# Patient Record
Sex: MALE | Race: WHITE | Employment: FULL TIME | URBAN - METROPOLITAN AREA
[De-identification: names, ages, dates, MRNs, and addresses within clinical notes are randomized per-mention and may not be internally consistent; named-entity substitution may affect disease eponyms.]

---

## 2019-03-28 ENCOUNTER — APPOINTMENT (OUTPATIENT)
Dept: GENERAL RADIOLOGY | Age: 54
End: 2019-03-28

## 2019-03-28 ENCOUNTER — HOSPITAL ENCOUNTER (EMERGENCY)
Age: 54
Discharge: LEFT BEFORE DISCHARGE | End: 2019-03-28
Attending: EMERGENCY MEDICINE

## 2019-03-28 VITALS
WEIGHT: 220 LBS | RESPIRATION RATE: 18 BRPM | HEIGHT: 69 IN | HEART RATE: 60 BPM | TEMPERATURE: 98.7 F | BODY MASS INDEX: 32.58 KG/M2 | DIASTOLIC BLOOD PRESSURE: 83 MMHG | OXYGEN SATURATION: 95 % | SYSTOLIC BLOOD PRESSURE: 120 MMHG

## 2019-03-28 DIAGNOSIS — R07.9 CHEST PAIN, UNSPECIFIED TYPE: Primary | ICD-10-CM

## 2019-03-28 DIAGNOSIS — R91.1 PULMONARY NODULE: ICD-10-CM

## 2019-03-28 LAB
ALBUMIN SERPL-MCNC: 3.7 GM/DL (ref 3.4–5)
ALP BLD-CCNC: 60 IU/L (ref 40–128)
ALT SERPL-CCNC: 38 U/L (ref 10–40)
ANION GAP SERPL CALCULATED.3IONS-SCNC: 13 MMOL/L (ref 4–16)
AST SERPL-CCNC: 29 IU/L (ref 15–37)
BASOPHILS ABSOLUTE: 0.1 K/CU MM
BASOPHILS RELATIVE PERCENT: 0.7 % (ref 0–1)
BILIRUB SERPL-MCNC: 0.3 MG/DL (ref 0–1)
BUN BLDV-MCNC: 12 MG/DL (ref 6–23)
CALCIUM SERPL-MCNC: 8.5 MG/DL (ref 8.3–10.6)
CHLORIDE BLD-SCNC: 105 MMOL/L (ref 99–110)
CO2: 19 MMOL/L (ref 21–32)
CREAT SERPL-MCNC: 1 MG/DL (ref 0.9–1.3)
D DIMER: <200 NG/ML(DDU)
DIFFERENTIAL TYPE: ABNORMAL
EKG ATRIAL RATE: 51 BPM
EKG ATRIAL RATE: 58 BPM
EKG DIAGNOSIS: NORMAL
EKG DIAGNOSIS: NORMAL
EKG P AXIS: 11 DEGREES
EKG P AXIS: 7 DEGREES
EKG P-R INTERVAL: 138 MS
EKG P-R INTERVAL: 140 MS
EKG Q-T INTERVAL: 424 MS
EKG Q-T INTERVAL: 452 MS
EKG QRS DURATION: 84 MS
EKG QRS DURATION: 96 MS
EKG QTC CALCULATION (BAZETT): 416 MS
EKG QTC CALCULATION (BAZETT): 416 MS
EKG R AXIS: 53 DEGREES
EKG R AXIS: 59 DEGREES
EKG T AXIS: 34 DEGREES
EKG T AXIS: 52 DEGREES
EKG VENTRICULAR RATE: 51 BPM
EKG VENTRICULAR RATE: 58 BPM
EOSINOPHILS ABSOLUTE: 0.1 K/CU MM
EOSINOPHILS RELATIVE PERCENT: 0.8 % (ref 0–3)
GFR AFRICAN AMERICAN: >60 ML/MIN/1.73M2
GFR NON-AFRICAN AMERICAN: >60 ML/MIN/1.73M2
GLUCOSE BLD-MCNC: 117 MG/DL (ref 70–99)
HCT VFR BLD CALC: 48.9 % (ref 42–52)
HEMOGLOBIN: 16 GM/DL (ref 13.5–18)
IMMATURE NEUTROPHIL %: 0.6 % (ref 0–0.43)
LYMPHOCYTES ABSOLUTE: 2.4 K/CU MM
LYMPHOCYTES RELATIVE PERCENT: 19.2 % (ref 24–44)
MCH RBC QN AUTO: 31.3 PG (ref 27–31)
MCHC RBC AUTO-ENTMCNC: 32.7 % (ref 32–36)
MCV RBC AUTO: 95.5 FL (ref 78–100)
MONOCYTES ABSOLUTE: 0.6 K/CU MM
MONOCYTES RELATIVE PERCENT: 5.1 % (ref 0–4)
NUCLEATED RBC %: 0 %
PDW BLD-RTO: 13.2 % (ref 11.7–14.9)
PLATELET # BLD: 288 K/CU MM (ref 140–440)
PMV BLD AUTO: 10.1 FL (ref 7.5–11.1)
POTASSIUM SERPL-SCNC: 4.9 MMOL/L (ref 3.5–5.1)
RBC # BLD: 5.12 M/CU MM (ref 4.6–6.2)
SEGMENTED NEUTROPHILS ABSOLUTE COUNT: 9.2 K/CU MM
SEGMENTED NEUTROPHILS RELATIVE PERCENT: 73.6 % (ref 36–66)
SODIUM BLD-SCNC: 137 MMOL/L (ref 135–145)
TOTAL IMMATURE NEUTOROPHIL: 0.08 K/CU MM
TOTAL NUCLEATED RBC: 0 K/CU MM
TOTAL PROTEIN: 6.5 GM/DL (ref 6.4–8.2)
TROPONIN T: 0.02 NG/ML
WBC # BLD: 12.5 K/CU MM (ref 4–10.5)

## 2019-03-28 PROCEDURE — 6370000000 HC RX 637 (ALT 250 FOR IP): Performed by: EMERGENCY MEDICINE

## 2019-03-28 PROCEDURE — 85379 FIBRIN DEGRADATION QUANT: CPT

## 2019-03-28 PROCEDURE — 93010 ELECTROCARDIOGRAM REPORT: CPT | Performed by: INTERNAL MEDICINE

## 2019-03-28 PROCEDURE — 93005 ELECTROCARDIOGRAM TRACING: CPT | Performed by: EMERGENCY MEDICINE

## 2019-03-28 PROCEDURE — 85025 COMPLETE CBC W/AUTO DIFF WBC: CPT

## 2019-03-28 PROCEDURE — 84484 ASSAY OF TROPONIN QUANT: CPT

## 2019-03-28 PROCEDURE — 36415 COLL VENOUS BLD VENIPUNCTURE: CPT

## 2019-03-28 PROCEDURE — 71046 X-RAY EXAM CHEST 2 VIEWS: CPT

## 2019-03-28 PROCEDURE — 99285 EMERGENCY DEPT VISIT HI MDM: CPT

## 2019-03-28 PROCEDURE — 80053 COMPREHEN METABOLIC PANEL: CPT

## 2019-03-28 RX ORDER — ASPIRIN 81 MG/1
81 TABLET, CHEWABLE ORAL DAILY
Qty: 30 TABLET | Refills: 0 | Status: SHIPPED | OUTPATIENT
Start: 2019-03-28 | End: 2019-03-28 | Stop reason: SDUPTHER

## 2019-03-28 RX ORDER — ASPIRIN 81 MG/1
81 TABLET, CHEWABLE ORAL DAILY
Qty: 30 TABLET | Refills: 0 | Status: ON HOLD | OUTPATIENT
Start: 2019-03-28 | End: 2019-03-31 | Stop reason: SDUPTHER

## 2019-03-28 RX ORDER — NITROGLYCERIN 0.4 MG/1
0.4 TABLET SUBLINGUAL ONCE
Status: DISCONTINUED | OUTPATIENT
Start: 2019-03-28 | End: 2019-03-28 | Stop reason: HOSPADM

## 2019-03-28 RX ORDER — ASPIRIN 81 MG/1
324 TABLET, CHEWABLE ORAL ONCE
Status: COMPLETED | OUTPATIENT
Start: 2019-03-28 | End: 2019-03-28

## 2019-03-28 RX ADMIN — ASPIRIN 81 MG 324 MG: 81 TABLET ORAL at 11:21

## 2019-03-28 ASSESSMENT — PAIN DESCRIPTION - ORIENTATION: ORIENTATION: RIGHT

## 2019-03-28 ASSESSMENT — PAIN DESCRIPTION - PAIN TYPE: TYPE: ACUTE PAIN

## 2019-03-28 ASSESSMENT — PAIN SCALES - GENERAL
PAINLEVEL_OUTOF10: 2
PAINLEVEL_OUTOF10: 4

## 2019-03-28 ASSESSMENT — PAIN DESCRIPTION - LOCATION: LOCATION: ARM;CHEST

## 2019-03-28 NOTE — ED NOTES
Discharge instructions reviewed with patient; pt voiced understanding at this time.       Pollo Reyna RN  03/28/19 6188

## 2019-03-28 NOTE — PROGRESS NOTES
Medication History  [facility]    Patient Name: Delfina Castle 1965     Medication history has been completed by: Marcille Brunner, CPhT    Source(s) of information: Patient    Primary Care Physician: No primary care provider on file. Pharmacy: N/A    Allergies as of 03/28/2019    (No Known Allergies)        Prior to Admission medications    Medication Sig Start Date End Date Taking? Authorizing Provider   UNKNOWN TO PATIENT Take 1 each by mouth as needed (heartburn)   Yes Historical Provider, MD       Medications added or changed (ex.  new medication, dosage change, interval change, formulation change):  Heartburn medication new medication    Other Comments:  Reviewed and updated med list and allergy list per patient   Patient takes an OTC heartburn medication but unsure or the name of it    To my knowledge the above medication history is accurate as of 3/28/2019 1:02 PM.   Marcille Brunner, CPhT   3/28/2019 1:02 PM   z

## 2019-03-28 NOTE — ED PROVIDER NOTES
Triage Chief Complaint:   Chest Pain and Arm Pain (right arm pain)    SONIA Vanessa is a 48 y.o. male that presents with chest pain. Patient was in baseline state of health until last night when the above started. Chest pain is described as a burning, upper chest, constant, 4 out of 10 currently and worse with exertion. Some associated right arm \"achiness\". Some shortness of breath, nausea and sweating with exertion only. Symptoms did worsen at work today which prompted ED visit. Patient has never had this before. No cough. No fever. 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 No known coronary disease. Patient is a pack-a-day smoker. No illicit substances of abuse. Remote prior stress test but no prior cardiac cath. Patient does not currently have a primary care physician or cardiologist. No history DVT or PE. No prolonged immobilization or recent travel. ROS:  General:  No fevers, no chills, no weakness  Eyes:  No recent vison changes, no discharge  ENT:  No sore throat, no nasal congestion  Cardiovascular:  + chest pain, no palpitations  Respiratory:  + shortness of breath, no cough, no wheezing  Gastrointestinal:  No pain, + nausea, no vomiting, no diarrhea  Musculoskeletal:  No muscle pain, no joint pain  Skin:  No rash, no pruritis, no easy bruising, + sweating  Neurologic:  No speech problems, no headache, no extremity numbness, no extremity tingling, no extremity weakness  Psychiatric:  No anxiety  Genitourinary:  No dysuria, no increased urinary frequency  Extremities:  no edema, + right arm pain    History reviewed. No pertinent past medical history. History reviewed. No pertinent surgical history. History reviewed. No pertinent family history.   Social History     Socioeconomic History    Marital status:      Spouse name: Not on file    Number of children: Not on file    Years of education: Not on file    Highest education level: Not on file   Occupational History    Not on file   Social Needs    Financial resource strain: Not on file    Food insecurity:     Worry: Not on file     Inability: Not on file    Transportation needs:     Medical: Not on file     Non-medical: Not on file   Tobacco Use    Smoking status: Current Every Day Smoker     Packs/day: 1.00     Types: Cigarettes   Substance and Sexual Activity    Alcohol use: Yes    Drug use: Never    Sexual activity: Not on file   Lifestyle    Physical activity:     Days per week: Not on file     Minutes per session: Not on file    Stress: Not on file   Relationships    Social connections:     Talks on phone: Not on file     Gets together: Not on file     Attends Yazidi service: Not on file     Active member of club or organization: Not on file     Attends meetings of clubs or organizations: Not on file     Relationship status: Not on file    Intimate partner violence:     Fear of current or ex partner: Not on file     Emotionally abused: Not on file     Physically abused: Not on file     Forced sexual activity: Not on file   Other Topics Concern    Not on file   Social History Narrative    Not on file     Current Facility-Administered Medications   Medication Dose Route Frequency Provider Last Rate Last Dose    nitroGLYCERIN (NITROSTAT) SL tablet 0.4 mg  0.4 mg Sublingual Once Chayo Manzano MD         No current outpatient medications on file. No Known Allergies    Nursing Notes Reviewed    Physical Exam:  ED Triage Vitals   Enc Vitals Group      BP       Pulse       Resp       Temp       Temp src       SpO2       Weight       Height       Head Circumference       Peak Flow       Pain Score       Pain Loc       Pain Edu? Excl. in [address]? My pulse ox interpretation is - normal    General appearance:  No acute distress. Sitting comfortably in bed. Pleasant. Skin:  Warm. Dry.  no diaphoresis.    Eye: Extraocular movements intact. Ears, nose, mouth and throat:  Oral mucosa moist   Neck:  Trachea midline. Extremity:  Normal ROM. No bilateral lower extremity edema. No calf tenderness to palpation. Heart:  Regular rate and rhythm, normal S1 & S2, no extra heart sounds. Perfusion:  Intact  Respiratory:  Lungs clear to auscultation bilaterally. Respirations nonlabored. Speaking clearly in full sentences. No respiratory distress. Abdominal:  Normal bowel sounds. Soft. Nontender. Non distended. Back:  No CVA tenderness to palpation     Neurological:  Alert and oriented times 3. No focal neuro deficits.              Psychiatric:  Appropriate    I have reviewed and interpreted all of the currently available lab results from this visit (if applicable):  Results for orders placed or performed during the hospital encounter of 03/28/19   CBC auto diff   Result Value Ref Range    WBC 12.5 (H) 4.0 - 10.5 K/CU MM    RBC 5.12 4.6 - 6.2 M/CU MM    Hemoglobin 16.0 13.5 - 18.0 GM/DL    Hematocrit 48.9 42 - 52 %    MCV 95.5 78 - 100 FL    MCH 31.3 (H) 27 - 31 PG    MCHC 32.7 32.0 - 36.0 %    RDW 13.2 11.7 - 14.9 %    Platelets 873 024 - 801 K/CU MM    MPV 10.1 7.5 - 11.1 FL    Differential Type AUTOMATED DIFFERENTIAL     Segs Relative 73.6 (H) 36 - 66 %    Lymphocytes % 19.2 (L) 24 - 44 %    Monocytes % 5.1 (H) 0 - 4 %    Eosinophils % 0.8 0 - 3 %    Basophils % 0.7 0 - 1 %    Segs Absolute 9.2 K/CU MM    Lymphocytes # 2.4 K/CU MM    Monocytes # 0.6 K/CU MM    Eosinophils # 0.1 K/CU MM    Basophils # 0.1 K/CU MM    Nucleated RBC % 0.0 %    Total Nucleated RBC 0.0 K/CU MM    Total Immature Neutrophil 0.08 K/CU MM    Immature Neutrophil % 0.6 (H) 0 - 0.43 %   CMP   Result Value Ref Range    Sodium 137 135 - 145 MMOL/L    Potassium 4.9 3.5 - 5.1 MMOL/L    Chloride 105 99 - 110 mMol/L    CO2 19 (L) 21 - 32 MMOL/L    BUN 12 6 - 23 MG/DL    CREATININE 1.0 0.9 - 1.3 MG/DL    Glucose 117 (H) 70 - 99 MG/DL    Calcium 8.5 8.3 - 10.6 MG/DL    Alb 3.7 3.4 - 5.0 GM/DL    Total Protein 6.5 6.4 - 8.2 GM/DL    Total Bilirubin 0.3 0.0 - 1.0 MG/DL    ALT 38 10 - 40 U/L    AST 29 15 - 37 IU/L    Alkaline Phosphatase 60 40 - 128 IU/L    GFR Non-African American >60 >60 mL/min/1.73m2    GFR African American >60 >60 mL/min/1.73m2    Anion Gap 13 4 - 16   Troponin   Result Value Ref Range    Troponin T 0.021 (H) <0.01 NG/ML   D-dimer, Quantitative   Result Value Ref Range    D-Dimer, Quant <200 <230 NG/mL(DDU)   EKG 12 Lead   Result Value Ref Range    Ventricular Rate 58 BPM    Atrial Rate 58 BPM    P-R Interval 140 ms    QRS Duration 96 ms    Q-T Interval 424 ms    QTc Calculation (Bazett) 416 ms    P Axis 11 degrees    R Axis 53 degrees    T Axis 52 degrees    Diagnosis       Sinus bradycardia  Otherwise normal ECG  No previous ECGs available  Confirmed by [facility] MADELEINE CRUM ([id number]) on 3/28/2019 3:41:33 PM     EKG 12 Lead   Result Value Ref Range    Ventricular Rate 51 BPM    Atrial Rate 51 BPM    P-R Interval 138 ms    QRS Duration 84 ms    Q-T Interval 452 ms    QTc Calculation (Bazett) 416 ms    P Axis 7 degrees    R Axis 59 degrees    T Axis 34 degrees    Diagnosis       Sinus bradycardia with sinus arrhythmia  Otherwise normal ECG  When compared with ECG of 28-MAR-2019 10:28,  No significant change was found  Confirmed by [facility] MD, [facility] ([id number]) on 3/28/2019 3:42:38 PM        Radiographs (if obtained):  [] The following radiograph was interpreted by myself in the absence of a radiologist:   [x] Radiologist's Report Reviewed:  XR CHEST STANDARD (2 VW)   Final Result   12 mm nodular density superimposed upon right upper lobe could represent bone   spur from anterior right 1st rib, underlying granuloma, but cannot exclude a   malignant pulmonary nodule. Otherwise, no definite acute pulmonary finding. RECOMMENDATION:   Consider follow-up CT scan of the chest for further evaluation of possible   pulmonary nodule right upper lobe. EKG (if obtained): (All EKG's are interpreted by myself in the absence of a cardiologist)  12 lead EKG per my interpretation:  Sinus xin at 58  Axis is   Normal  QTc is  within an acceptable range  There is no specific T wave changes appreciated. There is no specific ST wave changes appreciated. No STEMI    Prior EKG to compare with was not available. Chart review shows recent radiographs:  No results found. MDM:  Pt presents as above. Emergent conditions considered. Presentation prompted initial labs, EKG and imaging as above. EKG with sinus bradycardia as above. Patient treated symptomatically with aspirin and nitroglycerin. CBC with small leukocytosis. CMP without clinically significant derangement. Troponin is abnormal. D-dimer negative. Chest x-ray demonstrating a likely pulmonary nodule. Given patient's active chest pain, lack of recent medical evaluation or cardiology workup, abnormal troponin decision made to admit the patient. On discussion with the patient and significant other on the need for admission they're declining at this time. They report \"we should have gone to [place]\". I expressed my concern with the patient leaving at this time and on the need for admission. I also offered transfer to other facility should they want to be evaluated some more else and strongly encourage this option if he did not want to stay here. I expressed my concerns that patient could die if he leaves the hospital facility this time and they're understanding this. Both patient and wife expressed a desire to leave. I counseled them that they should they change their mind or symptoms worsen they should come back immediately for admission and further cardiac assessment or head to nearest hospital facility.   I also instructed them that should they be pursuing outpatient follow-up at the need to call cardiology today to arrange for further outpatient testing and they did verbalize understanding of doing this. Contact information for on-call cardiology was given further requests. Furthermore, patient was started on aspirin. Patient and wife with capacity to make medical decisions at this time and they both verbalized understanding of my concern the patient could be having a life-threatening cardiac/heart process and still would like to leave. Further paperwork was provided regarding my concerns and patient's chest pain as well as incidental finding of a pulmonary nodule. Patient signed out against medical advice for this reason. Questions sought and answered with the patient and wife. They voice understanding and agree with plan. Instructed to return for any worsening or worrisome concerns. Clinical Impression:  1. Chest pain, unspecified type    2. Pulmonary nodule      Disposition referral (if applicable):  No follow-up provider specified. Disposition medications (if applicable):  New Prescriptions    No medications on file       Comment: Please note this report has been produced using speech recognition software and may contain errors related to that system including errors in grammar, punctuation, and spelling, as well as words and phrases that may be inappropriate. If there are any questions or concerns please feel free to contact the dictating provider for clarification.        Monse Romo MD  03/28/19 1516

## 2019-03-29 ENCOUNTER — INITIAL CONSULT (OUTPATIENT)
Dept: CARDIOLOGY CLINIC | Age: 54
End: 2019-03-29

## 2019-03-29 ENCOUNTER — HOSPITAL ENCOUNTER (INPATIENT)
Age: 54
LOS: 2 days | Discharge: HOME | DRG: 247 | End: 2019-03-31
Attending: INTERNAL MEDICINE | Admitting: INTERNAL MEDICINE

## 2019-03-29 ENCOUNTER — PROCEDURE VISIT (OUTPATIENT)
Dept: CARDIOLOGY CLINIC | Age: 54
End: 2019-03-29

## 2019-03-29 ENCOUNTER — HOSPITAL ENCOUNTER (OUTPATIENT)
Age: 54
Discharge: HOME | DRG: 247 | End: 2019-03-29

## 2019-03-29 VITALS
SYSTOLIC BLOOD PRESSURE: 138 MMHG | DIASTOLIC BLOOD PRESSURE: 88 MMHG | BODY MASS INDEX: 33.65 KG/M2 | HEART RATE: 80 BPM | HEIGHT: 69 IN | WEIGHT: 227.2 LBS

## 2019-03-29 DIAGNOSIS — R07.2 PRECORDIAL PAIN: ICD-10-CM

## 2019-03-29 DIAGNOSIS — R07.9 CHEST PAIN, UNSPECIFIED TYPE: Primary | ICD-10-CM

## 2019-03-29 PROBLEM — Z72.0 TOBACCO ABUSE: Status: ACTIVE | Noted: 2019-03-29

## 2019-03-29 PROBLEM — I21.3 STEMI (ST ELEVATION MYOCARDIAL INFARCTION) (HCC): Status: ACTIVE | Noted: 2019-03-29

## 2019-03-29 PROBLEM — I10 ESSENTIAL HYPERTENSION: Status: ACTIVE | Noted: 2019-03-29

## 2019-03-29 LAB
ACTIVATED CLOTTING TIME, LOW RANGE: 228 SEC
ACTIVATED CLOTTING TIME, LOW RANGE: >400 SEC
LV EF: 55 %
LVEF MODALITY: NORMAL
TROPONIN T: 0.22 NG/ML
TROPONIN T: 0.47 NG/ML

## 2019-03-29 PROCEDURE — 99253 IP/OBS CNSLTJ NEW/EST LOW 45: CPT | Performed by: INTERNAL MEDICINE

## 2019-03-29 PROCEDURE — 94761 N-INVAS EAR/PLS OXIMETRY MLT: CPT

## 2019-03-29 PROCEDURE — C1887 CATHETER, GUIDING: HCPCS

## 2019-03-29 PROCEDURE — 93000 ELECTROCARDIOGRAM COMPLETE: CPT | Performed by: INTERNAL MEDICINE

## 2019-03-29 PROCEDURE — 6360000002 HC RX W HCPCS: Performed by: INTERNAL MEDICINE

## 2019-03-29 PROCEDURE — 93458 L HRT ARTERY/VENTRICLE ANGIO: CPT

## 2019-03-29 PROCEDURE — 92941 PRQ TRLML REVSC TOT OCCL AMI: CPT

## 2019-03-29 PROCEDURE — 84484 ASSAY OF TROPONIN QUANT: CPT

## 2019-03-29 PROCEDURE — 2100000000 HC CCU R&B

## 2019-03-29 PROCEDURE — 99243 OFF/OP CNSLTJ NEW/EST LOW 30: CPT | Performed by: INTERNAL MEDICINE

## 2019-03-29 PROCEDURE — C1894 INTRO/SHEATH, NON-LASER: HCPCS

## 2019-03-29 PROCEDURE — 92929 PR PRQ TRLUML CORONARY STENT W/ANGIO ADDL ART/BRNCH: CPT | Performed by: INTERNAL MEDICINE

## 2019-03-29 PROCEDURE — 6370000000 HC RX 637 (ALT 250 FOR IP): Performed by: INTERNAL MEDICINE

## 2019-03-29 PROCEDURE — 2709999900 HC NON-CHARGEABLE SUPPLY

## 2019-03-29 PROCEDURE — 85347 COAGULATION TIME ACTIVATED: CPT

## 2019-03-29 PROCEDURE — 6370000000 HC RX 637 (ALT 250 FOR IP)

## 2019-03-29 PROCEDURE — 93458 L HRT ARTERY/VENTRICLE ANGIO: CPT | Performed by: INTERNAL MEDICINE

## 2019-03-29 PROCEDURE — B2111ZZ FLUOROSCOPY OF MULTIPLE CORONARY ARTERIES USING LOW OSMOLAR CONTRAST: ICD-10-PCS | Performed by: INTERNAL MEDICINE

## 2019-03-29 PROCEDURE — B2151ZZ FLUOROSCOPY OF LEFT HEART USING LOW OSMOLAR CONTRAST: ICD-10-PCS | Performed by: INTERNAL MEDICINE

## 2019-03-29 PROCEDURE — C1760 CLOSURE DEV, VASC: HCPCS

## 2019-03-29 PROCEDURE — C1725 CATH, TRANSLUMIN NON-LASER: HCPCS

## 2019-03-29 PROCEDURE — 2580000003 HC RX 258: Performed by: INTERNAL MEDICINE

## 2019-03-29 PROCEDURE — 93015 CV STRESS TEST SUPVJ I&R: CPT | Performed by: INTERNAL MEDICINE

## 2019-03-29 PROCEDURE — C1874 STENT, COATED/COV W/DEL SYS: HCPCS

## 2019-03-29 PROCEDURE — 36415 COLL VENOUS BLD VENIPUNCTURE: CPT

## 2019-03-29 PROCEDURE — C1769 GUIDE WIRE: HCPCS

## 2019-03-29 PROCEDURE — 4A023N7 MEASUREMENT OF CARDIAC SAMPLING AND PRESSURE, LEFT HEART, PERCUTANEOUS APPROACH: ICD-10-PCS | Performed by: INTERNAL MEDICINE

## 2019-03-29 PROCEDURE — 027035Z DILATION OF CORONARY ARTERY, ONE ARTERY WITH TWO DRUG-ELUTING INTRALUMINAL DEVICES, PERCUTANEOUS APPROACH: ICD-10-PCS | Performed by: INTERNAL MEDICINE

## 2019-03-29 RX ORDER — SODIUM CHLORIDE 0.9 % (FLUSH) 0.9 %
10 SYRINGE (ML) INJECTION EVERY 12 HOURS SCHEDULED
Status: DISCONTINUED | OUTPATIENT
Start: 2019-03-29 | End: 2019-03-31 | Stop reason: HOSPADM

## 2019-03-29 RX ORDER — LISINOPRIL 10 MG/1
10 TABLET ORAL DAILY
Status: DISCONTINUED | OUTPATIENT
Start: 2019-03-29 | End: 2019-03-30

## 2019-03-29 RX ORDER — SODIUM CHLORIDE 0.9 % (FLUSH) 0.9 %
10 SYRINGE (ML) INJECTION PRN
Status: DISCONTINUED | OUTPATIENT
Start: 2019-03-29 | End: 2019-03-31 | Stop reason: HOSPADM

## 2019-03-29 RX ORDER — SODIUM CHLORIDE 9 MG/ML
INJECTION, SOLUTION INTRAVENOUS CONTINUOUS
Status: DISCONTINUED | OUTPATIENT
Start: 2019-03-29 | End: 2019-03-30

## 2019-03-29 RX ORDER — SODIUM CHLORIDE 0.9 % (FLUSH) 0.9 %
10 SYRINGE (ML) INJECTION EVERY 12 HOURS SCHEDULED
Status: DISCONTINUED | OUTPATIENT
Start: 2019-03-29 | End: 2019-03-29

## 2019-03-29 RX ORDER — ASPIRIN 81 MG/1
81 TABLET, CHEWABLE ORAL DAILY
Status: DISCONTINUED | OUTPATIENT
Start: 2019-03-30 | End: 2019-03-31 | Stop reason: HOSPADM

## 2019-03-29 RX ORDER — ENALAPRILAT 2.5 MG/2ML
1.25 INJECTION INTRAVENOUS EVERY 6 HOURS PRN
Status: DISCONTINUED | OUTPATIENT
Start: 2019-03-29 | End: 2019-03-31 | Stop reason: HOSPADM

## 2019-03-29 RX ORDER — ATORVASTATIN CALCIUM 40 MG/1
80 TABLET, FILM COATED ORAL NIGHTLY
Status: DISCONTINUED | OUTPATIENT
Start: 2019-03-29 | End: 2019-03-31 | Stop reason: HOSPADM

## 2019-03-29 RX ORDER — ONDANSETRON 2 MG/ML
4 INJECTION INTRAMUSCULAR; INTRAVENOUS EVERY 6 HOURS PRN
Status: DISCONTINUED | OUTPATIENT
Start: 2019-03-29 | End: 2019-03-31 | Stop reason: HOSPADM

## 2019-03-29 RX ORDER — ATORVASTATIN CALCIUM 20 MG/1
20 TABLET, FILM COATED ORAL NIGHTLY
Status: DISCONTINUED | OUTPATIENT
Start: 2019-03-29 | End: 2019-03-29

## 2019-03-29 RX ORDER — ACETAMINOPHEN 325 MG/1
650 TABLET ORAL EVERY 4 HOURS PRN
Status: DISCONTINUED | OUTPATIENT
Start: 2019-03-29 | End: 2019-03-31 | Stop reason: HOSPADM

## 2019-03-29 RX ORDER — SODIUM CHLORIDE 0.9 % (FLUSH) 0.9 %
10 SYRINGE (ML) INJECTION PRN
Status: DISCONTINUED | OUTPATIENT
Start: 2019-03-29 | End: 2019-03-29

## 2019-03-29 RX ORDER — ACETAMINOPHEN 80 MG
TABLET,CHEWABLE ORAL
Status: DISPENSED
Start: 2019-03-29 | End: 2019-03-30

## 2019-03-29 RX ADMIN — ENOXAPARIN SODIUM 40 MG: 40 INJECTION SUBCUTANEOUS at 14:53

## 2019-03-29 RX ADMIN — SODIUM CHLORIDE: 9 INJECTION, SOLUTION INTRAVENOUS at 23:34

## 2019-03-29 RX ADMIN — ATORVASTATIN CALCIUM 80 MG: 40 TABLET, FILM COATED ORAL at 21:00

## 2019-03-29 RX ADMIN — METOPROLOL TARTRATE 12.5 MG: 25 TABLET ORAL at 14:52

## 2019-03-29 RX ADMIN — LISINOPRIL 10 MG: 10 TABLET ORAL at 15:51

## 2019-03-29 RX ADMIN — METOPROLOL TARTRATE 12.5 MG: 25 TABLET ORAL at 21:01

## 2019-03-29 RX ADMIN — TICAGRELOR 90 MG: 90 TABLET ORAL at 21:00

## 2019-03-29 ASSESSMENT — PAIN SCALES - GENERAL
PAINLEVEL_OUTOF10: 0
PAINLEVEL_OUTOF10: 0

## 2019-03-29 NOTE — CONSULTS
CARDIOLOGY CONSULT NOTE     Reason for consultation:  STEMI    Referring physician:   Selma Weston MD     Primary care physician:  No primary care provider on file. History of pres ent illness:  Apparently patient signed out AMA from ED yesterday, when he was seen for chest pain. He was seen at our office today by my associate  FOR CONTINUED EPISODIC CHEST PAIN, noted to have inferior wall STEMI on EKG. He was referred to Cath lab for Primary intervention    Past medical history:    has a past medical history of Chest pain. Past surgical history:   has no past surgical history on file. Social History:   reports that he has been smoking cigarettes. He has been smoking about 1.00 pack per day. He has never used smokeless tobacco. He reports that he drinks alcohol. He reports that he does not use drugs. Family history:  As Reviewed family history is not on file.     No Known Allergies      sodium chloride flush 0.9 % injection 10 mL 2 times per day   sodium chloride flush 0.9 % injection 10 mL PRN   magnesium hydroxide (MILK OF MAGNESIA) 400 MG/5ML suspension 30 mL Daily PRN   ondansetron (ZOFRAN) injection 4 mg Q6H PRN   atorvastatin (LIPITOR) tablet 20 mg Nightly   [START ON 3/30/2019] aspirin chewable tablet 81 mg Daily   enoxaparin (LOVENOX) injection 40 mg Daily   acetaminophen (TYLENOL) tablet 650 mg Q4H PRN   metoprolol tartrate (LOPRESSOR) tablet 12.5 mg BID       Review of Systems:   · Constitutional: No Fever or Weight Loss  · Eyes: No Decreased Vision  · ENT: No Headaches, Hearing Loss or Vertigo  · Cardiovascular:  chest pain, no dyspnea on exertion, palpitations or loss of consciousness  · Respiratory: No cough or wheezing    · Gastrointestinal: No abdominal pain, appetite loss, blood in stools, constipation, diarrhea or heartburn  · Genitourinary: No dysuria, trouble voiding, or hematuria  · Musculoskeletal:  No gait disturbance, weakness or joint complaints  · Integumentary: No rash or pruritis  · Neurological: No TIA or stroke symptoms  · Psychiatric: No anxiety or depression  · Endocrine: No malaise, fatigue or temperature intolerance  · Hematologic/Lymphatic: No bleeding problems, blood clots or swollen lymph nodes  · Allergic/Immunologic: No nasal congestion or hives    Physical Examination:    There were no vitals taken for this visit. General Appearance:  Non-obese/Well Nourished  1. Skin: It is warm & dry. No rashes noted. 2. Eyes: No conjunctival Pallor seen. No jaundice noted. 3. HEENT: atraumatic / normocephalic. EOMI. Neck is supple there is no elevation of JVD. No thyromegaly  4. Respiratory:  · Resp Assessment: Normal  · Resp Auscultation: Normal breath sounds without dullness  5. Cardiovascular:  · Auscultation: Normal   · Carotid Arteries: Normal  · Abdominal Aorta: Normal   · Femoral Arteries: 2+ and equal  · Pedal Pulses: 2+ and equal   6. Abdomen:  · No masses or tenderness  · Liver/Spleen: No Abnormalities Noted, no organomegaly. 7. Musculoskeletal: No joint deformities. No muscle wasting  8. Extremities:  ·  No Cyanosis or Clubbing. No significant edema   9. Rectal / genital: deferred. 10. Neurological/Psychiatric:  · Oriented to time, place, and person  · Non-anxious      Lab Review   Recent Labs     03/28/19  1107   WBC 12.5*   HGB 16.0   HCT 48.9         Recent Labs     03/28/19  1107      K 4.9      CO2 19*   BUN 12   CREATININE 1.0     Recent Labs     03/28/19  1107   AST 29   ALT 38   BILITOT 0.3   ALKPHOS 60     No results for input(s): TROPONINI in the last 72 hours.   No results found for: BNP  No results found for: INR, PROTIME    EKG:  As noted above      Assessment:  Patient Active Problem List   Diagnosis Code    Precordial pain R07.2    [facility] I21.3       Recommendations:   Cath possible PCI  INFORMED CONSENT OBTAINED  ASA & Mallampati 2:2     Suzette Prince MD, 3/29/2019 12:44 PM

## 2019-03-29 NOTE — PLAN OF CARE
Problem: HEMODYNAMIC STATUS  Goal: Patient has stable vital signs and fluid balance  Outcome: Ongoing     Problem: FLUID AND ELECTROLYTE IMBALANCE  Goal: Fluid and electrolyte balance are achieved/maintained  Outcome: Ongoing     Problem: OXYGENATION/RESPIRATORY FUNCTION  Goal: Patient will maintain patent airway  Outcome: Ongoing  Goal: Patient will achieve/maintain normal respiratory rate/effort  Description  Respiratory rate and effort will be within normal limits for the patient  Outcome: Ongoing     Problem: ACTIVITY INTOLERANCE/IMPAIRED MOBILITY  Goal: Mobility/activity is maintained at optimum level for patient  Outcome: Ongoing     Problem: PSYCHOSOCIAL NEEDS  Goal: Demonstrates ability to cope with illness  Outcome: Ongoing

## 2019-03-29 NOTE — CONSULTS
Nutrition Education    Type and Reason for Visit: Consult, Patient Education    · Verbally reviewed following information with Patient: Heart Healthy Eating Nutrition Therapy (Nutrition Care Manual). · Written educational materials provided. · Contact name and number provided. · Refer to Patient Education activity for more details.     Electronically signed by Zully Oviedo RD, BLANKA on 3/29/19 at 2:26 PM    Contact Number: [id number]

## 2019-03-29 NOTE — H&P
-      History and Physical      Name:  Julio Choudhury /Age/Sex: 1965  (48 y.o. male)   MRN & CSN:  [id number] & [id number] Admission Date/Time: 3/29/2019 11:44 AM   Location:  -A PCP: No primary care provider on file. Hospital Day: 1    History of Present Illness:     Chief Complaint: chest pain    Julio Choudhury is a 48 y.o.  male  who presents with chest pain of sudden onset. Patient presents with 2 days history of chest pain. The pain is described as constant, awakening patient from sleep, burning, which radiates to the right arm. It is aggravated by walking, and alleviated by rest.  Patient rates pain as a 5/10 in intensity. Associated symptoms are chest pressure/discomfort, dyspnea, fatigue and nausea and sweating. Patient's cardiac risk factors are dyslipidemia, hypertension, male gender and microalbuminuria. Ten point ROS reviewed negative, unless as noted above    Objective: Intake/Output Summary (Last 24 hours) at 3/29/2019 1620  Last data filed at 3/29/2019 1451  Gross per 24 hour   Intake --   Output 1200 ml   Net -1200 ml      Vitals:   Vitals:    19 1520   BP:    Pulse: 67   Resp: 15   Temp:    SpO2: 98%     Physical Exam:   General appearance: alert, appears stated age, cooperative and moderately obese  Neck: no carotid bruit, no JVD and supple, symmetrical, trachea midline  Lungs: clear to auscultation bilaterally  Heart: regular rate and rhythm, S1, S2 normal, no murmur, click, rub or gallop  Abdomen: soft, non-tender; bowel sounds normal; no masses,  no organomegaly  Extremities: extremities normal, atraumatic, no cyanosis or edema  Pulses: 2+ and symmetric  Skin: Skin color, texture, turgor normal. No rashes or lesions  Neurologic: Grossly normal    Past Medical History:      Past Medical History:   Diagnosis Date    Chest pain      PSHX:  has no past surgical history on file.     Home Medications   Prior to Admission medications Medication Sig Start Date End Date Taking? Authorizing Provider   aspirin (ASPIRIN CHILDRENS) 81 MG chewable tablet Take 1 tablet by mouth daily 3/28/19  Yes Justice Brand MD       Allergies: No Known Allergies    FAM HX: family history is not on file. Soc HX:   Social History     Socioeconomic History    Marital status:      Spouse name: Not on file    Number of children: Not on file    Years of education: Not on file    Highest education level: Not on file   Occupational History    Not on file   Social Needs    Financial resource strain: Not on file    Food insecurity:     Worry: Not on file     Inability: Not on file    Transportation needs:     Medical: Not on file     Non-medical: Not on file   Tobacco Use    Smoking status: Current Every Day Smoker     Packs/day: 1.00     Types: Cigarettes    Smokeless tobacco: Never Used   Substance and Sexual Activity    Alcohol use:  Yes    Drug use: Never    Sexual activity: Not on file   Lifestyle    Physical activity:     Days per week: Not on file     Minutes per session: Not on file    Stress: Not on file   Relationships    Social connections:     Talks on phone: Not on file     Gets together: Not on file     Attends Zoroastrian service: Not on file     Active member of club or organization: Not on file     Attends meetings of clubs or organizations: Not on file     Relationship status: Not on file    Intimate partner violence:     Fear of current or ex partner: Not on file     Emotionally abused: Not on file     Physically abused: Not on file     Forced sexual activity: Not on file   Other Topics Concern    Not on file   Social History Narrative    Not on file       Medications:   Medications:    [START ON 3/30/2019] aspirin  81 mg Oral Daily    enoxaparin  40 mg Subcutaneous Daily    metoprolol tartrate  12.5 mg Oral BID    atorvastatin  80 mg Oral Nightly    sodium chloride flush  10 mL Intravenous 2 times per day    ticagrelor  90 mg Oral BID    pill splitter        lisinopril  10 mg Oral Daily      Infusions:    sodium chloride 75 mL/hr at 03/29/19 1308     PRN Meds:   magnesium hydroxide 30 mL Daily PRN   ondansetron 4 mg Q6H PRN   acetaminophen 650 mg Q4H PRN   sodium chloride flush 10 mL PRN         Assessment and Plan:   Effie Doan is a 48 y.o.  male  who presents with history, clinical and diagnostic features consistent with STEMI (ST elevation myocardial infarction) ([facility]    1.  NSTEMI  - Cardiology evaluation, interventions and recommendations acknowledged with thanks  - s/p PCI with stents x 2; will follow report  - continue post-PCI care per protocol  - currently on ASA, Lipitor, Ticagrelor, Lisinopril and Metoprolol  - will continue ASCVD risk factor mitigation          Electronically signed by Nadine Bryson MD on 3/29/2019 at 4:20 PM    Admitting Hospitalist

## 2019-03-30 LAB
ANION GAP SERPL CALCULATED.3IONS-SCNC: 9 MMOL/L (ref 4–16)
BUN BLDV-MCNC: 11 MG/DL (ref 6–23)
CALCIUM SERPL-MCNC: 8.1 MG/DL (ref 8.3–10.6)
CHLORIDE BLD-SCNC: 104 MMOL/L (ref 99–110)
CHOLESTEROL: 238 MG/DL
CO2: 24 MMOL/L (ref 21–32)
CREAT SERPL-MCNC: 1 MG/DL (ref 0.9–1.3)
ESTIMATED AVERAGE GLUCOSE: 123 MG/DL
GFR AFRICAN AMERICAN: >60 ML/MIN/1.73M2
GFR NON-AFRICAN AMERICAN: >60 ML/MIN/1.73M2
GLUCOSE BLD-MCNC: 111 MG/DL (ref 70–99)
HBA1C MFR BLD: 5.9 % (ref 4.2–6.3)
HCT VFR BLD CALC: 48.6 % (ref 42–52)
HDLC SERPL-MCNC: 39 MG/DL
HEMOGLOBIN: 16.1 GM/DL (ref 13.5–18)
LDL CHOLESTEROL DIRECT: 198 MG/DL
MCH RBC QN AUTO: 30.7 PG (ref 27–31)
MCHC RBC AUTO-ENTMCNC: 33.1 % (ref 32–36)
MCV RBC AUTO: 92.6 FL (ref 78–100)
PDW BLD-RTO: 13.1 % (ref 11.7–14.9)
PLATELET # BLD: 266 K/CU MM (ref 140–440)
PMV BLD AUTO: 10.4 FL (ref 7.5–11.1)
POTASSIUM SERPL-SCNC: 4.5 MMOL/L (ref 3.5–5.1)
RBC # BLD: 5.25 M/CU MM (ref 4.6–6.2)
SODIUM BLD-SCNC: 137 MMOL/L (ref 135–145)
TRIGL SERPL-MCNC: 177 MG/DL
WBC # BLD: 10.9 K/CU MM (ref 4–10.5)

## 2019-03-30 PROCEDURE — 6370000000 HC RX 637 (ALT 250 FOR IP): Performed by: INTERNAL MEDICINE

## 2019-03-30 PROCEDURE — 99233 SBSQ HOSP IP/OBS HIGH 50: CPT | Performed by: INTERNAL MEDICINE

## 2019-03-30 PROCEDURE — 85027 COMPLETE CBC AUTOMATED: CPT

## 2019-03-30 PROCEDURE — 80048 BASIC METABOLIC PNL TOTAL CA: CPT

## 2019-03-30 PROCEDURE — 80061 LIPID PANEL: CPT

## 2019-03-30 PROCEDURE — 2140000000 HC CCU INTERMEDIATE R&B

## 2019-03-30 PROCEDURE — 2580000003 HC RX 258: Performed by: INTERNAL MEDICINE

## 2019-03-30 PROCEDURE — 83721 ASSAY OF BLOOD LIPOPROTEIN: CPT

## 2019-03-30 PROCEDURE — 6360000002 HC RX W HCPCS: Performed by: INTERNAL MEDICINE

## 2019-03-30 PROCEDURE — 83036 HEMOGLOBIN GLYCOSYLATED A1C: CPT

## 2019-03-30 PROCEDURE — 94761 N-INVAS EAR/PLS OXIMETRY MLT: CPT

## 2019-03-30 RX ORDER — LISINOPRIL 20 MG/1
20 TABLET ORAL DAILY
Status: DISCONTINUED | OUTPATIENT
Start: 2019-03-31 | End: 2019-03-31 | Stop reason: HOSPADM

## 2019-03-30 RX ADMIN — LISINOPRIL 10 MG: 10 TABLET ORAL at 07:56

## 2019-03-30 RX ADMIN — TICAGRELOR 90 MG: 90 TABLET ORAL at 21:12

## 2019-03-30 RX ADMIN — SODIUM CHLORIDE, PRESERVATIVE FREE 10 ML: 5 INJECTION INTRAVENOUS at 21:13

## 2019-03-30 RX ADMIN — ATORVASTATIN CALCIUM 80 MG: 40 TABLET, FILM COATED ORAL at 21:12

## 2019-03-30 RX ADMIN — TICAGRELOR 90 MG: 90 TABLET ORAL at 07:57

## 2019-03-30 RX ADMIN — ENOXAPARIN SODIUM 40 MG: 40 INJECTION SUBCUTANEOUS at 07:56

## 2019-03-30 RX ADMIN — ASPIRIN 81 MG 81 MG: 81 TABLET ORAL at 07:56

## 2019-03-30 RX ADMIN — METOPROLOL TARTRATE 12.5 MG: 25 TABLET ORAL at 07:57

## 2019-03-30 RX ADMIN — METOPROLOL TARTRATE 25 MG: 25 TABLET ORAL at 21:12

## 2019-03-30 NOTE — PLAN OF CARE
Problem: HEMODYNAMIC STATUS  Goal: Patient has stable vital signs and fluid balance  3/30/2019 0027 by Roddy Arana RN  Outcome: Ongoing  3/29/2019 1306 by Loan Mota. Ashley Garrison RN  Outcome: Ongoing     Problem: FLUID AND ELECTROLYTE IMBALANCE  Goal: Fluid and electrolyte balance are achieved/maintained  3/30/2019 0027 by Roddy Arana RN  Outcome: Ongoing  3/29/2019 1306 by Loan Mota. Ashley Garrison RN  Outcome: Ongoing     Problem: OXYGENATION/RESPIRATORY FUNCTION  Goal: Patient will maintain patent airway  3/30/2019 0027 by Roddy Arana RN  Outcome: Ongoing  3/29/2019 1306 by Loan Mota. Ashley Garrison RN  Outcome: Ongoing  Goal: Patient will achieve/maintain normal respiratory rate/effort  Description  Respiratory rate and effort will be within normal limits for the patient  3/30/2019 7992 by Roddy Arana RN  Outcome: Ongoing  3/29/2019 1306 by Loan Mota. Ashley Garrison RN  Outcome: Ongoing     Problem: ACTIVITY INTOLERANCE/IMPAIRED MOBILITY  Goal: Mobility/activity is maintained at optimum level for patient  3/30/2019 0027 by Roddy Arana RN  Outcome: Ongoing  3/29/2019 1306 by Loan Mota. Ashley Garrison RN  Outcome: Ongoing     Problem: PSYCHOSOCIAL NEEDS  Goal: Demonstrates ability to cope with illness  3/30/2019 0027 by Roddy Arana RN  Outcome: Ongoing  3/29/2019 1306 by Loan Mota.  Ashley Garrison RN  Outcome: Ongoing     Problem: Falls - Risk of:  Goal: Will remain free from falls  Description  Will remain free from falls  Outcome: Ongoing  Goal: Absence of physical injury  Description  Absence of physical injury  Outcome: Ongoing

## 2019-03-31 ENCOUNTER — APPOINTMENT (OUTPATIENT)
Dept: CT IMAGING | Age: 54
DRG: 247 | End: 2019-03-31
Attending: INTERNAL MEDICINE

## 2019-03-31 VITALS
TEMPERATURE: 98.8 F | OXYGEN SATURATION: 97 % | DIASTOLIC BLOOD PRESSURE: 90 MMHG | SYSTOLIC BLOOD PRESSURE: 137 MMHG | RESPIRATION RATE: 19 BRPM | HEART RATE: 66 BPM | BODY MASS INDEX: 32.5 KG/M2 | HEIGHT: 69 IN | WEIGHT: 219.4 LBS

## 2019-03-31 PROCEDURE — 2580000003 HC RX 258: Performed by: INTERNAL MEDICINE

## 2019-03-31 PROCEDURE — 6370000000 HC RX 637 (ALT 250 FOR IP): Performed by: INTERNAL MEDICINE

## 2019-03-31 PROCEDURE — 6360000002 HC RX W HCPCS: Performed by: INTERNAL MEDICINE

## 2019-03-31 PROCEDURE — 94761 N-INVAS EAR/PLS OXIMETRY MLT: CPT

## 2019-03-31 PROCEDURE — 99232 SBSQ HOSP IP/OBS MODERATE 35: CPT | Performed by: INTERNAL MEDICINE

## 2019-03-31 PROCEDURE — 71250 CT THORAX DX C-: CPT

## 2019-03-31 RX ORDER — ATORVASTATIN CALCIUM 80 MG/1
80 TABLET, FILM COATED ORAL NIGHTLY
Qty: 30 TABLET | Refills: 0 | Status: SHIPPED | OUTPATIENT
Start: 2019-03-31 | End: 2019-04-30 | Stop reason: SDUPTHER

## 2019-03-31 RX ORDER — CLOPIDOGREL 300 MG/1
600 TABLET, FILM COATED ORAL ONCE
Status: COMPLETED | OUTPATIENT
Start: 2019-03-31 | End: 2019-03-31

## 2019-03-31 RX ORDER — CLOPIDOGREL BISULFATE 75 MG/1
75 TABLET ORAL DAILY
Qty: 30 TABLET | Refills: 0 | Status: SHIPPED | OUTPATIENT
Start: 2019-03-31 | End: 2019-04-30 | Stop reason: SDUPTHER

## 2019-03-31 RX ORDER — ASPIRIN 81 MG/1
81 TABLET, CHEWABLE ORAL DAILY
Qty: 30 TABLET | Refills: 0 | Status: SHIPPED | OUTPATIENT
Start: 2019-03-31 | End: 2019-04-30 | Stop reason: SDUPTHER

## 2019-03-31 RX ORDER — LISINOPRIL 20 MG/1
20 TABLET ORAL DAILY
Qty: 30 TABLET | Refills: 0 | Status: SHIPPED | OUTPATIENT
Start: 2019-04-01 | End: 2019-04-30 | Stop reason: SDUPTHER

## 2019-03-31 RX ADMIN — LISINOPRIL 20 MG: 20 TABLET ORAL at 08:28

## 2019-03-31 RX ADMIN — ASPIRIN 81 MG 81 MG: 81 TABLET ORAL at 08:28

## 2019-03-31 RX ADMIN — SODIUM CHLORIDE, PRESERVATIVE FREE 10 ML: 5 INJECTION INTRAVENOUS at 08:28

## 2019-03-31 RX ADMIN — ENOXAPARIN SODIUM 40 MG: 40 INJECTION SUBCUTANEOUS at 08:28

## 2019-03-31 RX ADMIN — TICAGRELOR 90 MG: 90 TABLET ORAL at 08:28

## 2019-03-31 RX ADMIN — CLOPIDOGREL BISULFATE 600 MG: 300 TABLET, FILM COATED ORAL at 11:03

## 2019-03-31 RX ADMIN — METOPROLOL TARTRATE 25 MG: 25 TABLET ORAL at 08:28

## 2019-03-31 NOTE — PROGRESS NOTES
Cardiology Progress Note     Admit Date:  3/29/2019    Consult reason/ Seen today for :   Post stemi   PCI to RCA and PDA     Subjective and  Overnight Events :  No chest pain feels good , BP is better , He has no insurance so Pao Record will be expensive      Chief complain on admission : 48 y. o.year old who is admitted forNo chief complaint on file. Assessment / Plan:  ASCVD: Continue aspirin and plavix  for atleast one yr, continue statins, ACEinhibitors, betablockers  Due to cost issues pt is self pay, load with plavix today and start plavix 75 mg , stop brilanta   Increase lisinopril and metoprolol  Start lipitor 80 mg   DVT Prophylaxis if no contraindication  Discharge home    Past medical history:    has a past medical history of Chest pain. Past surgical history:   has no past surgical history on file. Social History:   reports that he has been smoking cigarettes. He has been smoking about 1.00 pack per day. He has never used smokeless tobacco. He reports that he drinks alcohol. He reports that he does not use drugs. Family history:  family history is not on file. No Known Allergies    Review of Systems:    All 14 systems were reviewed and are negative  Except for the positive findings  which as documented     BP (!) 137/90   Pulse 66   Temp 98.8 °F (37.1 °C) (Oral)   Resp 19   Ht 5' 9\" (1.753 m)   Wt 219 lb 6.4 oz (99.5 kg)   SpO2 97%   BMI 32.40 kg/m²     No intake or output data in the 24 hours ending 03/31/19 1355  Physical Exam:  Constitutional:  Well developed, Well nourished, No acute distress, Non-toxic appearance. HENT:  Normocephalic, Atraumatic, Bilateral external ears normal, Oropharynx moist, No oral exudates, Nose normal. Neck- Normal range of motion, No tenderness, Supple, No stridor. Eyes:  PERRL, EOMI, Conjunctiva normal, No discharge.    Respiratory:  Normal breath sounds, No respiratory
Dr. Davion Rice at bedside and up dated on patient condition. Discussed hypertension, received order for lisinopril.
Procedure site Rt groin angioseal    Time patient arrived to room:1252    Hematoma noted? No    Upon arrival to patient's room, procedure site assessed by:       SILAS Coronado RN  And Alonso Moreno RN
Report called to [facility] on 3 N. Patient transported to room 3108 per wheelchair.
Seen by Cath Lab nurse. Patient is alert and oriented and conversational. Discussed STEMI; involving right coronary artery and how this has affected the heart muscle. We discussed how to watch for signs and symptoms of Heart Failure and Pneumonia along with education on how to use the Heart Failure and Pneumonia stoplight. Reinforced the importance of early symptoms recognition and calling their doctor when in the yellow and red zone. Educational material left with patient in reference to discussed material. Pt and family voiced understood. Questions asked and answered.
Stent identification card given to wife at bedside.
tenderness. Cardiovascular:  Normal heart rate, Normal rhythm, No murmurs, No rubs, No gallops, JVP not elevated  Abdomen/GI:  Bowel sounds normal, Soft, No tenderness, No masses, No pulsatile masses. Musculoskeletal:  Intact distal pulses, No edema, No tenderness, No cyanosis, No clubbing. Good range of motion in all major joints. No tenderness to palpation or major deformities noted. Back- No tenderness. Integument:  Warm, Dry, No erythema, No rash. Lymphatic:  No lymphadenopathy noted. Neurologic:  Alert & oriented x 3, Normal motor function, Normal sensory function, No focal deficits noted. Psychiatric:  Affect  and  Mood :no change    Medications:    aspirin  81 mg Oral Daily    enoxaparin  40 mg Subcutaneous Daily    metoprolol tartrate  12.5 mg Oral BID    atorvastatin  80 mg Oral Nightly    sodium chloride flush  10 mL Intravenous 2 times per day    ticagrelor  90 mg Oral BID    lisinopril  10 mg Oral Daily       magnesium hydroxide, ondansetron, acetaminophen, sodium chloride flush, enalaprilat    Lab Data:  CBC:   Recent Labs     03/28/19  1107 03/30/19  0445   WBC 12.5* 10.9*   HGB 16.0 16.1   HCT 48.9 48.6   MCV 95.5 92.6    266     BMP:   Recent Labs     03/28/19  1107 03/30/19  0445    137   K 4.9 4.5    104   CO2 19* 24   BUN 12 11   CREATININE 1.0 1.0     PT/INR: No results for input(s): PROTIME, INR in the last 72 hours. BNP:  No results for input(s): PROBNP in the last 72 hours. TROPONIN:   Recent Labs     03/28/19  1107 03/29/19  1100 03/29/19  2113   TROPONINT 0.021* 0.224* 0.473*        ECHO :   echocardiogram    Assessment:  48 y. o.year old who is admitted forNo chief complaint on file. , active issues as noted below:  Impression:  Principal Problem:    STEMI (ST elevation myocardial infarction) ([facility] [address] RCA  Active Problems:    Essential hypertension    Tobacco abuse  Resolved Problems:    * No resolved hospital problems.  *            All labs,

## 2019-03-31 NOTE — CARE COORDINATION
Notified by patient's nurse Janee Quezada that patient needs prescription assistance since he does not have any insurance . CM spoke with Addie Muñoz from [employer]. She approved a voucher for $100.00. Spoke with patient and his wife at bedside. CM provided voucher and [employer] contact # for patient to f/u with after discharge for possible continued assistance. CM provided a  PCP list for patient also since patient does not have a PCP. Patient stated that he will f/u himself with making a new patient appointment and declined needing assist . Patient is independent and plans to return home with his wife . No other needs identified.

## 2019-04-01 ENCOUNTER — TELEPHONE (OUTPATIENT)
Dept: CARDIOLOGY CLINIC | Age: 54
End: 2019-04-01

## 2019-04-01 NOTE — DISCHARGE SUMMARY
Discharge Summary    Name:  Roger Jama /Age/Sex: 1965  (48 y.o. male)   MRN & CSN:  [id number] & [id number] Admission Date/Time: 3/29/2019 11:44 AM   Attending:  Rachael Bean MD Discharging Physician: Samir Turner MD     HPI:     Per H&P:  Chief Complaint: chest pain     Roger Jama is a 48 y.o.  male  who presents with chest pain of sudden onset. Patient presents with 2 days history of chest pain. The pain is described as constant, awakening patient from sleep, burning, which radiates to the right arm. It is aggravated by walking, and alleviated by rest.  Patient rates pain as a 5/10 in intensity. Associated symptoms are chest pressure/discomfort, dyspnea, fatigue and nausea and sweating. Patient's cardiac risk factors are dyslipidemia, hypertension, male gender and microalbuminuria. Hospital Course:       STEMI  -patient signed out of ED AMA day prior to admission apparently, and was seen at cardiology office the next day and noticed to have episodic CP and an IW STEMI on EKG - he was sent to the cath lab. -he had an RCA stent  -ASA and Plavix for 1 year    Hyperlipidemia  -LDL is 198, and goal is less than 70 - started on high dose Lipitor    Hypertension  -lisinopril and metoprolol started    Tobacco use - recommend cessation    Multiple pulmonary nodules, small - recommend follow up CT in 1 year, discussed with patient    Emphysema seen on CT - appears asymptomatic, discussed with patient smoking cessation    COPY OF CARDIAC PROCEDURE IS BELOW:       1. Severe single vessel disease with total occlusion of RCA (   culprit vessel ) with collaterals & mild to moderate disease of   the other vessels.   2. Successful stenting of RCA with excellent results.   3. LV: infero basal Hypokinesis with preserved LV function. LVEF   is 55 %.       Patient tolerated the procedure well.   No immediate complications.      Recommendations   Aggressive risk factor 03/30/2019    CREATININE 1.0 03/30/2019    CALCIUM 8.1 03/30/2019     Hepatic:   Recent Labs     03/28/19  1107   AST 29   ALT 38   BILITOT 0.3   ALKPHOS 60      Results for Anuel Vyas (MRN [id number]) as of 3/31/2019 10:32   Ref. Range 3/28/2019 11:07 3/29/2019 11:00 3/29/2019 21:13 3/30/2019 04:45   Troponin T Latest Ref Range: <0.01 NG/ML 0.021 (H) 0.224 (HH) 0.473 (HH)      IMAGING:     CT CHEST WO CONTRAST [[id number]] Collected: 03/31/19 1318     Order Status: Completed Updated: 03/31/19 1333     Narrative:       EXAMINATION:  CT OF THE CHEST WITHOUT CONTRAST 3/31/2019 12:19 pm    TECHNIQUE:  CT of the chest was performed without the administration of intravenous  contrast. Multiplanar reformatted images are provided for review. Dose  modulation, iterative reconstruction, and/or weight based adjustment of the  mA/kV was utilized to reduce the radiation dose to as low as reasonably  achievable. COMPARISON:  Chest radiograph 03/28/2019. HISTORY:  ORDERING SYSTEM PROVIDED HISTORY: RUL nodule seen on CXR, radiologist  recommends CT  TECHNOLOGIST PROVIDED HISTORY:  Ordering Physician Provided Reason for Exam: RUL nodule seen on CXR,  radiologist recommends CT  Acuity: Acute  Type of Exam: Initial  Additional signs and symptoms: none  Relevant Medical/Surgical History: none    FINDINGS:  Mediastinum: Lack of intravenous contrast limits evaluation of the  mediastinum. The thoracic aorta is normal in caliber with mild calcific  plaquing. Coronary artery atherosclerotic vascular calcifications are also  seen. The main pulmonary artery is normal in caliber. The heart is normal  in size. No pericardial effusion. The mediastinal esophagus and thyroid  gland are unremarkable. No pathologically enlarged lymph nodes are seen in  the chest.    Lungs/pleura: The central airways are patent. No pleural effusion or  pneumothorax. Mild to moderate emphysematous changes visually in the upper  lobes.   Very mild bilateral dependent atelectasis. No consolidation or  interstitial edema. 4 mm nodule in the right upper lobe on image 42 series  3. Micronodule in the right middle lobe on image 78. Micronodule in the  right middle lobe on image 84.  3 mm nodule in the right lower lobe on image  63.  3 mm nodule in the right lower lobe on image 67.  2 mm nodule in the  left upper lobe on image 44.  3 mm nodule in the left lower lobe on image 87.  3 mm nodule in the left lower lobe on image 65.  3 mm nodule in the left  lower lobe on image 43. The possible 12 mm nodule in the right upper lobe  suggested on the recent chest radiograph likely corresponds to a prominent  osseous excrescence arising from the anterior aspect of the right 1st rib. Upper Abdomen: Scattered hypodensities in the liver measuring up to 2.6 cm  likely representing benign cysts or hemangiomas. The bilateral adrenal  glands are normal in appearance. Limited images through the upper abdomen  demonstrate no acute process. Soft Tissues/Bones: No acute osseous or soft tissue abnormality. Impression:       1. The 12 mm nodule suggested in the right upper lobe on the recent chest  radiograph likely corresponds to a prominent osseous excrescence arising from  the anterior aspect of the 1st rib. 2. Small scattered bilateral pulmonary nodules as detailed above measuring up  to approximately 4 mm. See recommendations below. 3. Mild to moderate emphysematous changes. 4. Coronary artery atherosclerotic vascular calcifications. RECOMMENDATIONS:  Fleischner Society guidelines for follow-up and management of incidentally  detected pulmonary nodules:    Multiple Solid Nodules:    Nodule size less than 6 mm  In a high-risk patient, optional CT at 12 months. - Low risk patients include individuals with minimal or absent history of  smoking and other known risk factors.     - High risk patients include individuals with a history or smoking or

## 2019-04-03 ENCOUNTER — OFFICE VISIT (OUTPATIENT)
Dept: CARDIOLOGY CLINIC | Age: 54
End: 2019-04-03

## 2019-04-03 VITALS
HEART RATE: 68 BPM | WEIGHT: 222.8 LBS | DIASTOLIC BLOOD PRESSURE: 70 MMHG | SYSTOLIC BLOOD PRESSURE: 110 MMHG | BODY MASS INDEX: 33 KG/M2 | HEIGHT: 69 IN

## 2019-04-03 DIAGNOSIS — I10 ESSENTIAL HYPERTENSION: Primary | ICD-10-CM

## 2019-04-03 PROCEDURE — 99213 OFFICE O/P EST LOW 20 MIN: CPT | Performed by: INTERNAL MEDICINE

## 2019-04-03 PROCEDURE — 93000 ELECTROCARDIOGRAM COMPLETE: CPT | Performed by: INTERNAL MEDICINE

## 2019-04-03 NOTE — PROGRESS NOTES
4/3/2019    RE: Siobhan Tirado  (1965)                               TO:  Dr. Milagros Groves primary care provider on file. Jak Benitez is a 48 y.o. male who was seen today for management of  Cad  Here SP  PCI for STEMI                                    HPI:   - Coronary artery disease, does not have chest pain. Patient is  compliant with prescribed medicines. - Hyperlipidimea, lipids are in acceptable range. Pt  is  compliant with medicines   - Hypertension,is  well controlled, pt is  compliant with medicines      Zechariah Hummel has the following history recorded in care path:  Patient Active Problem List    Diagnosis Date Noted    Precordial pain 03/29/2019    STEMI (ST elevation myocardial infarction) ([facility] [address] RCA 03/29/2019    Essential hypertension 03/29/2019    Tobacco abuse 03/29/2019     Current Outpatient Medications   Medication Sig Dispense Refill    aspirin (ASPIRIN CHILDRENS) 81 MG chewable tablet Take 1 tablet by mouth daily 30 tablet 0    atorvastatin (LIPITOR) 80 MG tablet Take 1 tablet by mouth nightly 30 tablet 0    clopidogrel (PLAVIX) 75 MG tablet Take 1 tablet by mouth daily 30 tablet 0    lisinopril (PRINIVIL;ZESTRIL) 20 MG tablet Take 1 tablet by mouth daily 30 tablet 0    metoprolol tartrate (LOPRESSOR) 25 MG tablet Take 1 tablet by mouth 2 times daily 60 tablet 0     No current facility-administered medications for this visit. Allergies: Patient has no known allergies. Past Medical History:   Diagnosis Date    Chest pain     History of cardiac cath 03/29/2019    Severe single vessel disease with total occlusion of RCA with collaterals & mild to moderate disease of the other vessels.  Successful stenting of RCA with excellent results. LV: infero basal Hypokinesis with preserved LV function. LVEF 55%    History of exercise stress test 03/29/2019    Reduced exercise performance with angina , has ST elevation in inf leads was sent to cath lab. ASA GIVEN. History reviewed. No pertinent surgical history. As reviewed History reviewed. No pertinent family history. Social History     Tobacco Use    Smoking status: Former Smoker     Packs/day: 1.00     Types: Cigarettes    Smokeless tobacco: Never Used   Substance Use Topics    Alcohol use: Yes      Review of Systems:    Constitutional: Negative for diaphoresis and fatigue  Psychological:Negative for anxiety or depression  HENT: Negative for headaches, nasal congestion, sinus pain or vertigo  Eyes: Negative for visual disturbance. Endocrine: Negative for polydipsia/polyuria  Respiratory: Negative for shortness of breath  Cardiovascular: Negative for chest pain, dyspnea on exertion, claudication, edema, irregular heartbeat, murmur, palpitations or shortness of breath  Gastrointestinal: Negative for abdominal pain or heartburn  Genito-Urinary: Negative for urinary frequency/urgency  Musculoskeletal: Negative for muscle pain, muscular weakness, negative for pain in arm and leg or swelling in foot and leg  Neurological: Negative for dizziness, headaches, memory loss, numbness/tingling, visual changes, syncope  Dermatological: Negative for rash    Objective:  /70   Pulse 68   Ht 5' 9\" (1.753 m)   Wt 222 lb 12.8 oz (101.1 kg)   BMI 32.90 kg/m²   Wt Readings from Last 3 Encounters:   04/03/19 222 lb 12.8 oz (101.1 kg)   03/30/19 219 lb 6.4 oz (99.5 kg)   03/29/19 227 lb 3.2 oz (103.1 kg)     Body mass index is 32.9 kg/m². GENERAL - Alert, oriented, pleasant, in no apparent distress. EYES: No jaundice, no conjunctival pallor. SKIN: It is warm & dry. No rashes. No Echhymosis    HEENT - No clinically significant abnormalities seen. Neck - Supple.   No jugular venous distention noted. No carotid bruits. Cardiovascular - Normal S1 and S2 without obvious murmur or gallop. Extremities - No cyanosis, clubbing, or significant edema. Pulmonary - No respiratory distress. No wheezes or rales. Abdomen - No masses, tenderness, or organomegaly. Musculoskeletal - No significant edema. No joint deformities. No muscle wasting. Neurologic - Cranial nerves II through XII are grossly intact. There were no gross focal neurologic abnormalities. Lab Review   Lab Results   Component Value Date    TROPONINT 0.473 03/29/2019     BNP:  No results found for: BNP  PT/INR:  No results found for: INR  Lab Results   Component Value Date    LABA1C 5.9 03/30/2019     Lab Results   Component Value Date    WBC 10.9 (H) 03/30/2019    HCT 48.6 03/30/2019    MCV 92.6 03/30/2019     03/30/2019     Lab Results   Component Value Date    CHOL 238 (H) 03/30/2019    TRIG 177 (H) 03/30/2019    HDL 39 (L) 03/30/2019    LDLDIRECT 198 (H) 03/30/2019     Lab Results   Component Value Date    ALT 38 03/28/2019    AST 29 03/28/2019     BMP:    Lab Results   Component Value Date     03/30/2019    K 4.5 03/30/2019     03/30/2019    CO2 24 03/30/2019    BUN 11 03/30/2019    CREATININE 1.0 03/30/2019     CMP:   Lab Results   Component Value Date     03/30/2019    K 4.5 03/30/2019     03/30/2019    CO2 24 03/30/2019    BUN 11 03/30/2019    PROT 6.5 03/28/2019     TSH:  No results found for: TSH, TSHHS    . Impression:    1. Essential hypertension       Patient Active Problem List   Diagnosis Code    Precordial pain R07.2    STEMI (ST elevation myocardial infarction) ([facility] [address] RCA I21.3    Essential hypertension I10    Tobacco abuse Z72.0       Assessment & Plan:               -     CORONARY ARTERY DISEASE:  asymptomatic     All available  tests in chart reviewed. Management discussed .   Testing ordered  ETT nd rehab                                 -  LIPID MANAGEMENT:  Available lipid  lab data reviewed  and patient was given dietary advice. NCEP- ATP III guidelines reviewed with patient. -   Changes  in medicines made: No                         -  Hypertension: Patients blood pressure is normal. Patient is advised about low sodium diet. Present medical regimen will not be changed.                                         Imani Molina MA  [facility] [place]

## 2019-04-15 ENCOUNTER — HOSPITAL ENCOUNTER (OUTPATIENT)
Age: 54
Setting detail: OBSERVATION
LOS: 1 days | Discharge: HOME | End: 2019-04-16
Attending: EMERGENCY MEDICINE | Admitting: INTERNAL MEDICINE

## 2019-04-15 ENCOUNTER — APPOINTMENT (OUTPATIENT)
Dept: GENERAL RADIOLOGY | Age: 54
End: 2019-04-15

## 2019-04-15 DIAGNOSIS — R07.9 CHEST PAIN, UNSPECIFIED TYPE: Primary | ICD-10-CM

## 2019-04-15 LAB
ALBUMIN SERPL-MCNC: 4 GM/DL (ref 3.4–5)
ALP BLD-CCNC: 75 IU/L (ref 40–129)
ALT SERPL-CCNC: 23 U/L (ref 10–40)
ANION GAP SERPL CALCULATED.3IONS-SCNC: 10 MMOL/L (ref 4–16)
AST SERPL-CCNC: 14 IU/L (ref 15–37)
BASOPHILS ABSOLUTE: 0.1 K/CU MM
BASOPHILS RELATIVE PERCENT: 0.8 % (ref 0–1)
BILIRUB SERPL-MCNC: 0.2 MG/DL (ref 0–1)
BUN BLDV-MCNC: 12 MG/DL (ref 6–23)
CALCIUM SERPL-MCNC: 8.9 MG/DL (ref 8.3–10.6)
CHLORIDE BLD-SCNC: 102 MMOL/L (ref 99–110)
CO2: 23 MMOL/L (ref 21–32)
CREAT SERPL-MCNC: 1 MG/DL (ref 0.9–1.3)
DIFFERENTIAL TYPE: ABNORMAL
EOSINOPHILS ABSOLUTE: 0.1 K/CU MM
EOSINOPHILS RELATIVE PERCENT: 1.1 % (ref 0–3)
GFR AFRICAN AMERICAN: >60 ML/MIN/1.73M2
GFR NON-AFRICAN AMERICAN: >60 ML/MIN/1.73M2
GLUCOSE BLD-MCNC: 102 MG/DL (ref 70–99)
HCT VFR BLD CALC: 51.2 % (ref 42–52)
HEMOGLOBIN: 16.3 GM/DL (ref 13.5–18)
IMMATURE NEUTROPHIL %: 0.6 % (ref 0–0.43)
LYMPHOCYTES ABSOLUTE: 2.6 K/CU MM
LYMPHOCYTES RELATIVE PERCENT: 20.8 % (ref 24–44)
MCH RBC QN AUTO: 30.4 PG (ref 27–31)
MCHC RBC AUTO-ENTMCNC: 31.8 % (ref 32–36)
MCV RBC AUTO: 95.5 FL (ref 78–100)
MONOCYTES ABSOLUTE: 0.6 K/CU MM
MONOCYTES RELATIVE PERCENT: 4.9 % (ref 0–4)
NUCLEATED RBC %: 0 %
PDW BLD-RTO: 12.8 % (ref 11.7–14.9)
PLATELET # BLD: 317 K/CU MM (ref 140–440)
PMV BLD AUTO: 10.2 FL (ref 7.5–11.1)
POTASSIUM SERPL-SCNC: 4.4 MMOL/L (ref 3.5–5.1)
RBC # BLD: 5.36 M/CU MM (ref 4.6–6.2)
SEGMENTED NEUTROPHILS ABSOLUTE COUNT: 8.9 K/CU MM
SEGMENTED NEUTROPHILS RELATIVE PERCENT: 71.8 % (ref 36–66)
SODIUM BLD-SCNC: 135 MMOL/L (ref 135–145)
TOTAL IMMATURE NEUTOROPHIL: 0.07 K/CU MM
TOTAL NUCLEATED RBC: 0 K/CU MM
TOTAL PROTEIN: 7.1 GM/DL (ref 6.4–8.2)
TROPONIN T: <0.01 NG/ML
TROPONIN T: <0.01 NG/ML
WBC # BLD: 12.4 K/CU MM (ref 4–10.5)

## 2019-04-15 PROCEDURE — 94761 N-INVAS EAR/PLS OXIMETRY MLT: CPT

## 2019-04-15 PROCEDURE — 85025 COMPLETE CBC W/AUTO DIFF WBC: CPT

## 2019-04-15 PROCEDURE — 99253 IP/OBS CNSLTJ NEW/EST LOW 45: CPT | Performed by: INTERNAL MEDICINE

## 2019-04-15 PROCEDURE — 1200000000 HC SEMI PRIVATE

## 2019-04-15 PROCEDURE — 80053 COMPREHEN METABOLIC PANEL: CPT

## 2019-04-15 PROCEDURE — 2580000003 HC RX 258: Performed by: INTERNAL MEDICINE

## 2019-04-15 PROCEDURE — 93005 ELECTROCARDIOGRAM TRACING: CPT | Performed by: EMERGENCY MEDICINE

## 2019-04-15 PROCEDURE — 36415 COLL VENOUS BLD VENIPUNCTURE: CPT

## 2019-04-15 PROCEDURE — 6370000000 HC RX 637 (ALT 250 FOR IP): Performed by: INTERNAL MEDICINE

## 2019-04-15 PROCEDURE — 84484 ASSAY OF TROPONIN QUANT: CPT

## 2019-04-15 PROCEDURE — 99285 EMERGENCY DEPT VISIT HI MDM: CPT

## 2019-04-15 PROCEDURE — 71046 X-RAY EXAM CHEST 2 VIEWS: CPT

## 2019-04-15 RX ORDER — POTASSIUM CHLORIDE 7.45 MG/ML
10 INJECTION INTRAVENOUS PRN
Status: DISCONTINUED | OUTPATIENT
Start: 2019-04-15 | End: 2019-04-16 | Stop reason: HOSPADM

## 2019-04-15 RX ORDER — SODIUM CHLORIDE 0.9 % (FLUSH) 0.9 %
10 SYRINGE (ML) INJECTION PRN
Status: DISCONTINUED | OUTPATIENT
Start: 2019-04-15 | End: 2019-04-16 | Stop reason: HOSPADM

## 2019-04-15 RX ORDER — PANTOPRAZOLE SODIUM 40 MG/1
40 TABLET, DELAYED RELEASE ORAL
Status: DISCONTINUED | OUTPATIENT
Start: 2019-04-16 | End: 2019-04-16 | Stop reason: HOSPADM

## 2019-04-15 RX ORDER — NITROGLYCERIN 0.4 MG/1
0.4 TABLET SUBLINGUAL EVERY 5 MIN PRN
Status: DISCONTINUED | OUTPATIENT
Start: 2019-04-15 | End: 2019-04-15 | Stop reason: SDUPTHER

## 2019-04-15 RX ORDER — POTASSIUM CHLORIDE 20 MEQ/1
40 TABLET, EXTENDED RELEASE ORAL PRN
Status: DISCONTINUED | OUTPATIENT
Start: 2019-04-15 | End: 2019-04-16 | Stop reason: HOSPADM

## 2019-04-15 RX ORDER — CLOPIDOGREL BISULFATE 75 MG/1
75 TABLET ORAL DAILY
Status: DISCONTINUED | OUTPATIENT
Start: 2019-04-16 | End: 2019-04-16 | Stop reason: HOSPADM

## 2019-04-15 RX ORDER — ASPIRIN 81 MG/1
81 TABLET, CHEWABLE ORAL DAILY
Status: DISCONTINUED | OUTPATIENT
Start: 2019-04-16 | End: 2019-04-16 | Stop reason: HOSPADM

## 2019-04-15 RX ORDER — ATORVASTATIN CALCIUM 40 MG/1
80 TABLET, FILM COATED ORAL NIGHTLY
Status: DISCONTINUED | OUTPATIENT
Start: 2019-04-15 | End: 2019-04-16 | Stop reason: HOSPADM

## 2019-04-15 RX ORDER — POTASSIUM CHLORIDE 1.5 G/1.77G
40 POWDER, FOR SOLUTION ORAL PRN
Status: DISCONTINUED | OUTPATIENT
Start: 2019-04-15 | End: 2019-04-16 | Stop reason: HOSPADM

## 2019-04-15 RX ORDER — LISINOPRIL 20 MG/1
20 TABLET ORAL DAILY
Status: DISCONTINUED | OUTPATIENT
Start: 2019-04-16 | End: 2019-04-16 | Stop reason: HOSPADM

## 2019-04-15 RX ORDER — ONDANSETRON 2 MG/ML
4 INJECTION INTRAMUSCULAR; INTRAVENOUS EVERY 6 HOURS PRN
Status: DISCONTINUED | OUTPATIENT
Start: 2019-04-15 | End: 2019-04-16 | Stop reason: HOSPADM

## 2019-04-15 RX ORDER — NITROGLYCERIN 0.4 MG/1
0.4 TABLET SUBLINGUAL EVERY 5 MIN PRN
Status: DISCONTINUED | OUTPATIENT
Start: 2019-04-15 | End: 2019-04-16 | Stop reason: HOSPADM

## 2019-04-15 RX ORDER — SODIUM CHLORIDE 0.9 % (FLUSH) 0.9 %
10 SYRINGE (ML) INJECTION EVERY 12 HOURS SCHEDULED
Status: DISCONTINUED | OUTPATIENT
Start: 2019-04-15 | End: 2019-04-16 | Stop reason: HOSPADM

## 2019-04-15 RX ADMIN — ATORVASTATIN CALCIUM 80 MG: 40 TABLET, FILM COATED ORAL at 21:05

## 2019-04-15 RX ADMIN — SODIUM CHLORIDE, PRESERVATIVE FREE 10 ML: 5 INJECTION INTRAVENOUS at 21:05

## 2019-04-15 RX ADMIN — METOPROLOL TARTRATE 25 MG: 25 TABLET ORAL at 21:05

## 2019-04-15 ASSESSMENT — PAIN SCALES - GENERAL
PAINLEVEL_OUTOF10: 2
PAINLEVEL_OUTOF10: 5

## 2019-04-15 ASSESSMENT — PAIN DESCRIPTION - LOCATION
LOCATION: CHEST
LOCATION: CHEST

## 2019-04-15 ASSESSMENT — PAIN DESCRIPTION - PAIN TYPE
TYPE: ACUTE PAIN
TYPE: ACUTE PAIN

## 2019-04-15 ASSESSMENT — PAIN DESCRIPTION - ORIENTATION: ORIENTATION: LEFT

## 2019-04-15 NOTE — ED TRIAGE NOTES
Pt presents to ED for c/o chest pain ongoing 2 hours ago.  reports mid sternal radiating into left arm. states pain is a 5/10 burning. had stents placed a week and a half ago

## 2019-04-15 NOTE — ED NOTES
This patient had a heart cath done on April 5th and had 2 stents placed per Dr Jt Spencer. He has not had any pain until 3 hours ago when he began having pain that he says was burning pain.       Malvin Barrientos RN  04/15/19 [id number]

## 2019-04-15 NOTE — CONSULTS
CARDIOLOGY CONSULT NOTE    Reason for consultation: Chest pain     Referring physician: Genesis Schumacher MD      Primary care physician: No primary care provider on file.        Dear Genesis Schumacher MD   Thanks for the consult.     History of present illness:Richard is a 48 y. o.year old who  presents with  chest pain for last one day, happening daily, intermittent for 15 to 20 mins and aggravated with activity substernal also,not reproducible with palpation, radiated to shoulder, 6/10, not tender to touch, its like burning associated with shortness of breath, + sweating, nausea, better with NTG in ED. No fever, no chills, no nausea no vomiting. Blood pressure, cholesterol, blood glucose and weight are well controlled.     Chief Complaint   Patient presents with    Chest Pain     ongoing 2 hours ago. reports mid sternal radiating into left arm. states pain is a 5/10 burning. had stents placed a week and a half ago       Past medical history:    has a past medical history of Chest pain, History of cardiac cath, and History of exercise stress test.  Past surgical history:   has no past surgical history on file. Social History:   reports that he has quit smoking. His smoking use included cigarettes. He smoked 1.00 pack per day. He has never used smokeless tobacco. He reports that he drinks alcohol. He reports that he does not use drugs.   Family history:   no family history of CAD, STROKE of DM      [START ON 4/16/2019] aspirin chewable tablet 81 mg Daily   atorvastatin (LIPITOR) tablet 80 mg Nightly   [START ON 4/16/2019] clopidogrel (PLAVIX) tablet 75 mg Daily   [START ON 4/16/2019] lisinopril (PRINIVIL;ZESTRIL) tablet 20 mg Daily   metoprolol tartrate (LOPRESSOR) tablet 25 mg BID   sodium chloride flush 0.9 % injection 10 mL 2 times per day   sodium chloride flush 0.9 % injection 10 mL PRN   magnesium hydroxide (MILK OF MAGNESIA) 400 MG/5ML suspension 30 mL Daily PRN   ondansetron (ZOFRAN) injection 4 mg Q6H PRN Headaches, Hearing Loss or Vertigo  · Cardiovascular: + chest pain, dyspnea on exertion, palpitations or loss of consciousness  · Respiratory: No cough or wheezing    · Gastrointestinal: No abdominal pain, appetite loss, blood in stools, constipation, diarrhea or heartburn  · Genitourinary: No dysuria, trouble voiding, or hematuria  · Musculoskeletal: No gait disturbance, weakness or joint complaints  · Integumentary: No rash or pruritis  · Neurological: No TIA or stroke symptoms  · Psychiatric: No anxiety or depression  · Endocrine: No malaise, fatigue or temperature intolerance  · Hematologic/Lymphatic: No bleeding problems, blood clots or swollen lymph nodes  · Allergic/Immunologic: No nasal congestion or hives  All systems negative except as marked.      ·    ·    ·      Physical Examination:    Vitals:    04/15/19 1545   BP: 120/82   Pulse: 60   Resp: 16   Temp: 98.2 °F (36.8 °C)   SpO2: 98%      Wt Readings from Last 3 Encounters:   04/15/19 222 lb (100.7 kg)   04/03/19 222 lb 12.8 oz (101.1 kg)   03/30/19 219 lb 6.4 oz (99.5 kg)     Body mass index is 32.78 kg/m².        General Appearance: No distress, conversant     Constitutional: Well developed, Well nourished, No acute distress, Non-toxic appearance.    HENT:  Normocephalic, Atraumatic, Bilateral external ears normal, Oropharynx moist, No oral exudates, Nose normal. Neck- Normal range of motion, No tenderness, Supple, No stridor,no apical-carotid delay, no carotid bruit  Eyes: PERRL, EOMI, Conjunctiva normal, No discharge.    Respiratory:  Normal breath sounds, No respiratory distress, No wheezing, No chest tenderness. ,no use of accessory muscles, diaphragm movement is normal  Cardiovascular: (PMI) apex non displaced,no lifts no thrills, no s3,no s4, Normal heart rate, Normal rhythm, No murmurs, No rubs, No gallops.  Carotid arteries pulse and amplitude are normal no bruit, no abdominal bruit noted ( normal abdominal aorta ausculation), femoral arteries

## 2019-04-16 VITALS
HEART RATE: 63 BPM | SYSTOLIC BLOOD PRESSURE: 128 MMHG | TEMPERATURE: 97.9 F | DIASTOLIC BLOOD PRESSURE: 88 MMHG | WEIGHT: 213.6 LBS | OXYGEN SATURATION: 97 % | HEIGHT: 69 IN | BODY MASS INDEX: 31.64 KG/M2 | RESPIRATION RATE: 24 BRPM

## 2019-04-16 LAB
ANION GAP SERPL CALCULATED.3IONS-SCNC: 13 MMOL/L (ref 4–16)
BUN BLDV-MCNC: 14 MG/DL (ref 6–23)
CALCIUM SERPL-MCNC: 9 MG/DL (ref 8.3–10.6)
CHLORIDE BLD-SCNC: 104 MMOL/L (ref 99–110)
CO2: 23 MMOL/L (ref 21–32)
CREAT SERPL-MCNC: 1 MG/DL (ref 0.9–1.3)
EKG ATRIAL RATE: 68 BPM
EKG DIAGNOSIS: NORMAL
EKG P AXIS: 2 DEGREES
EKG P-R INTERVAL: 134 MS
EKG Q-T INTERVAL: 386 MS
EKG QRS DURATION: 86 MS
EKG QTC CALCULATION (BAZETT): 410 MS
EKG R AXIS: 35 DEGREES
EKG T AXIS: -10 DEGREES
EKG VENTRICULAR RATE: 68 BPM
GFR AFRICAN AMERICAN: >60 ML/MIN/1.73M2
GFR NON-AFRICAN AMERICAN: >60 ML/MIN/1.73M2
GLUCOSE BLD-MCNC: 96 MG/DL (ref 70–99)
HCT VFR BLD CALC: 48.5 % (ref 42–52)
HEMOGLOBIN: 15.6 GM/DL (ref 13.5–18)
LV EF: 53 %
LVEF MODALITY: NORMAL
MCH RBC QN AUTO: 30.5 PG (ref 27–31)
MCHC RBC AUTO-ENTMCNC: 32.2 % (ref 32–36)
MCV RBC AUTO: 94.7 FL (ref 78–100)
PDW BLD-RTO: 12.8 % (ref 11.7–14.9)
PLATELET # BLD: 274 K/CU MM (ref 140–440)
PMV BLD AUTO: 10.2 FL (ref 7.5–11.1)
POTASSIUM SERPL-SCNC: 5.1 MMOL/L (ref 3.5–5.1)
RBC # BLD: 5.12 M/CU MM (ref 4.6–6.2)
SODIUM BLD-SCNC: 140 MMOL/L (ref 135–145)
TROPONIN T: <0.01 NG/ML
WBC # BLD: 12.5 K/CU MM (ref 4–10.5)

## 2019-04-16 PROCEDURE — 36415 COLL VENOUS BLD VENIPUNCTURE: CPT

## 2019-04-16 PROCEDURE — 80048 BASIC METABOLIC PNL TOTAL CA: CPT

## 2019-04-16 PROCEDURE — 99232 SBSQ HOSP IP/OBS MODERATE 35: CPT | Performed by: INTERNAL MEDICINE

## 2019-04-16 PROCEDURE — 93005 ELECTROCARDIOGRAM TRACING: CPT | Performed by: INTERNAL MEDICINE

## 2019-04-16 PROCEDURE — 6370000000 HC RX 637 (ALT 250 FOR IP): Performed by: INTERNAL MEDICINE

## 2019-04-16 PROCEDURE — 85027 COMPLETE CBC AUTOMATED: CPT

## 2019-04-16 PROCEDURE — 84484 ASSAY OF TROPONIN QUANT: CPT

## 2019-04-16 PROCEDURE — G0378 HOSPITAL OBSERVATION PER HR: HCPCS

## 2019-04-16 PROCEDURE — 93010 ELECTROCARDIOGRAM REPORT: CPT | Performed by: INTERNAL MEDICINE

## 2019-04-16 PROCEDURE — 2580000003 HC RX 258: Performed by: INTERNAL MEDICINE

## 2019-04-16 PROCEDURE — 93306 TTE W/DOPPLER COMPLETE: CPT

## 2019-04-16 RX ORDER — NITROGLYCERIN 0.4 MG/1
TABLET SUBLINGUAL
Qty: 25 TABLET | Refills: 3 | Status: SHIPPED | OUTPATIENT
Start: 2019-04-16 | End: 2020-04-22 | Stop reason: SDUPTHER

## 2019-04-16 RX ADMIN — ASPIRIN 81 MG 81 MG: 81 TABLET ORAL at 11:26

## 2019-04-16 RX ADMIN — LISINOPRIL 20 MG: 20 TABLET ORAL at 11:27

## 2019-04-16 RX ADMIN — METOPROLOL TARTRATE 25 MG: 25 TABLET ORAL at 11:27

## 2019-04-16 RX ADMIN — PANTOPRAZOLE SODIUM 40 MG: 40 TABLET, DELAYED RELEASE ORAL at 06:35

## 2019-04-16 RX ADMIN — CLOPIDOGREL BISULFATE 75 MG: 75 TABLET ORAL at 11:26

## 2019-04-16 RX ADMIN — SODIUM CHLORIDE, PRESERVATIVE FREE 10 ML: 5 INJECTION INTRAVENOUS at 11:27

## 2019-04-16 ASSESSMENT — PAIN SCALES - GENERAL: PAINLEVEL_OUTOF10: 0

## 2019-04-16 NOTE — PROGRESS NOTES
Today's plan: EKG TODAY AND THEN DISCHARGE HOME      Admit Date:  4/15/2019    Subjective:BETTER      Chief complaints on admission  Chief Complaint   Patient presents with    Chest Pain     ongoing 2 hours ago. reports mid sternal radiating into left arm. states pain is a 5/10 burning. had stents placed a week and a half ago         History of present illness:Richard is a 48 y. o.year old who  presents with had concerns including Chest Pain (ongoing 2 hours ago. reports mid sternal radiating into left arm. states pain is a 5/10 burning. had stents placed a week and a half ago). Past medical history:    has a past medical history of Chest pain, History of cardiac cath, and History of exercise stress test.  Past surgical history:   has no past surgical history on file. Social History:   reports that he has quit smoking. His smoking use included cigarettes. He smoked 1.00 pack per day. He has never used smokeless tobacco. He reports that he drinks alcohol. He reports that he does not use drugs. Family history:  family history is not on file. No Known Allergies      Objective:   BP (!) 112/55   Pulse 66   Temp 98.3 °F (36.8 °C) (Oral)   Resp 15   Ht 5' 9\" (1.753 m)   Wt 213 lb 9.6 oz (96.9 kg)   SpO2 98%   BMI 31.54 kg/m²   No intake or output data in the 24 hours ending 04/16/19 0557    TELEMETRY: Sinus     Physical Exam:  Constitutional:  Well developed, Well nourished, No acute distress, Non-toxic appearance. HENT:  Normocephalic, Atraumatic, Bilateral external ears normal, Oropharynx moist, No oral exudates, Nose normal. Neck- Normal range of motion, No tenderness, Supple, No stridor. Eyes:  PERRL, EOMI, Conjunctiva normal, No discharge. Respiratory:  Normal breath sounds, No respiratory distress, No wheezing, No chest tenderness. ,no use of accessory muscles, diaphragm movement is normal  Cardiovascular: (PMI) apex non displaced,no lifts no thrills, no s3,no s4, Normal heart rate, Normal rhythm, No murmurs, No rubs, No gallops. Carotid arteries pulse and amplitude are normal no bruit, no abdominal bruit noted ( normal abdominal aorta ausculation), femoral arteries pulse and amplitude are normal no bruit, pedal pulses are normal  GI:  Bowel sounds normal, Soft, No tenderness, No masses, No pulsatile masses. : External genitalia appear normal, No masses or lesions. No discharge. No CVA tenderness. Musculoskeletal:  Intact distal pulses, No edema, No tenderness, No cyanosis, No clubbing. Good range of motion in all major joints. No tenderness to palpation or major deformities noted. Back- No tenderness. Integument:  Warm, Dry, No erythema, No rash. Lymphatic:  No lymphadenopathy noted. Neurologic:  Alert & oriented x 3, Normal motor function, Normal sensory function, No focal deficits noted. Psychiatric:  Affect normal, Judgment normal, Mood normal.     Medications:    aspirin  81 mg Oral Daily    atorvastatin  80 mg Oral Nightly    clopidogrel  75 mg Oral Daily    lisinopril  20 mg Oral Daily    metoprolol tartrate  25 mg Oral BID    sodium chloride flush  10 mL Intravenous 2 times per day    enoxaparin  40 mg Subcutaneous QPM    pantoprazole  40 mg Oral QAM AC       sodium chloride flush, magnesium hydroxide, ondansetron, potassium chloride **OR** potassium alternative oral replacement **OR** potassium chloride, nitroGLYCERIN    Lab Data:  CBC:   Recent Labs     04/15/19  1129 04/16/19  0300   WBC 12.4* 12.5*   HGB 16.3 15.6   HCT 51.2 48.5   MCV 95.5 94.7    274     BMP:   Recent Labs     04/15/19  1129 04/16/19  0300    140   K 4.4 5.1    104   CO2 23 23   BUN 12 14   CREATININE 1.0 1.0     LIVER PROFILE:   Recent Labs     04/15/19  1129   AST 14*   ALT 23   BILITOT 0.2   ALKPHOS 75     PT/INR: No results for input(s): PROTIME, INR in the last 72 hours. APTT: No results for input(s): APTT in the last 72 hours.   BNP:  No results for input(s): BNP in the last 72 hours. TROPONIN: @TROPONINI:3@      Assessment:  48 y. o.year old who is admitted for          Plan:  1. CChest pain: ADMIT FOR OBSERVATION AND RULE OUT, RECENT PCI OF RCA, t waves inversion on inferior leads, continue lopressor,  Lisinopril, statins, plavix            Lulu Hope MD 4/16/2019 5:57 AM

## 2019-04-16 NOTE — DISCHARGE SUMMARY
Discharge Summary    Name:  Odilia Florentino /Age/Sex: 1965  (48 y.o. male)   MRN & CSN:  [id number] & [id number] Admission Date/Time: 4/15/2019 11:57 AM   Attending:  Amrita Simmons MD Discharging Physician: Grey Fournier MD     HPI:     Odilia Florentino is a 48 y.o.  male, with past medical history significant for CAD S/P PCI, who presented to the ED from home due chest pain. The present condition started 1 hour PTA as midsternal chest pain PS 5/10 aggravated by walking, relieved by rest. No shortness of breath or leg swelling. No PND or orthopnea. The patient was brought to the ED and was subsequently admitted. Hospital Course:   Odilia Florentino is a 48 y.o.  male  who presents with Chest pain    Chest pain evaluated by cardiology. Continue home medications  Follow up with cardiology as outpatient       The patient expressed appropriate understanding of and agreement with the discharge recommendations, medications, and plan. Consults this admission:  IP CONSULT TO CARDIOLOGY  IP CONSULT TO HOSPITALIST  IP CONSULT TO CARDIOLOGY    Discharge Instruction:   Follow up appointments:   Cardiology  Primary care physician:  within 2 weeks    Diet:  cardiac diet   Activity: activity as tolerated  Disposition: Discharged to:   ? Home  Condition on discharge: Stable    Discharge Medications:      [facility]   Home Medication Instructions [id number]    Printed on:19 1122   Medication Information                      aspirin (ASPIRIN CHILDRENS) 81 MG chewable tablet  Take 1 tablet by mouth daily             atorvastatin (LIPITOR) 80 MG tablet  Take 1 tablet by mouth nightly             clopidogrel (PLAVIX) 75 MG tablet  Take 1 tablet by mouth daily             lisinopril (PRINIVIL;ZESTRIL) 20 MG tablet  Take 1 tablet by mouth daily             metoprolol tartrate (LOPRESSOR) 25 MG tablet  Take 1 tablet by mouth 2 times daily             nitroGLYCERIN (NITROSTAT) 0.4 MG SL tablet  up to max of 3 total doses. If no relief after 1 dose, call 911. Objective Findings at Discharge:   BP (!) 112/55   Pulse 66   Temp 98.3 °F (36.8 °C) (Oral)   Resp 15   Ht 5' 9\" (1.753 m)   Wt 213 lb 9.6 oz (96.9 kg)   SpO2 98%   BMI 31.54 kg/m²            PHYSICAL EXAM   GEN Awake male, sitting upright in bed in no apparent distress. Appears given age. EYES Pupils are equally round. No scleral erythema, discharge, or conjunctivitis. HENT Mucous membranes are moist.   RESP Clear to auscultation, no wheezes, rales or rhonchi. CARDIO/VASC S1/S2 auscultated. No murmurs  GI Abdomen is soft without significant tenderness, masses, or guarding. MSK No gross joint deformities. Spontaneous movement of all extremities  SKIN Normal coloration, warm, dry. NEURO Grossly normal  PSYCH Awake, alert, oriented x 4. Affect appropriate.       BMP/CBC  Recent Labs     04/15/19  1129 04/16/19  0300    140   K 4.4 5.1    104   CO2 23 23   BUN 12 14   CREATININE 1.0 1.0   WBC 12.4* 12.5*   HCT 51.2 48.5    274       IMAGING:  XR CHEST STANDARD (2 VW) [[id number]] Collected: 04/15/19 1217      Order Status: Completed Specimen: Chest Updated: 04/15/19 1221     Narrative:       EXAMINATION:  TWO VIEWS OF THE CHEST    4/15/2019 12:05 pm    COMPARISON:  03/28/2019    HISTORY:  ORDERING SYSTEM PROVIDED HISTORY: Chest pain  TECHNOLOGIST PROVIDED HISTORY:  Reason for exam:->Chest pain  Ordering Physician Provided Reason for Exam: chest pain  Acuity: Acute  Type of Exam: Initial    FINDINGS:  Normal heart size and pulmonary vasculature.  No focal consolidations,  pleural effusions, or pneumothorax.     Impression:       No evidence of acute process in the chest.         Discharge Time of 35 minutes    Electronically signed by Mara Payan MD on 4/16/2019 at 11:22 AM

## 2019-04-17 LAB
EKG ATRIAL RATE: 56 BPM
EKG DIAGNOSIS: NORMAL
EKG P AXIS: -2 DEGREES
EKG P-R INTERVAL: 142 MS
EKG Q-T INTERVAL: 440 MS
EKG QRS DURATION: 86 MS
EKG QTC CALCULATION (BAZETT): 424 MS
EKG R AXIS: 35 DEGREES
EKG T AXIS: -18 DEGREES
EKG VENTRICULAR RATE: 56 BPM

## 2019-04-17 PROCEDURE — 93010 ELECTROCARDIOGRAM REPORT: CPT | Performed by: INTERNAL MEDICINE

## 2019-04-30 RX ORDER — CLOPIDOGREL BISULFATE 75 MG/1
75 TABLET ORAL DAILY
Qty: 30 TABLET | Refills: 5 | Status: SHIPPED | OUTPATIENT
Start: 2019-04-30 | End: 2019-10-24 | Stop reason: SDUPTHER

## 2019-04-30 RX ORDER — ASPIRIN 81 MG/1
81 TABLET, CHEWABLE ORAL DAILY
Qty: 30 TABLET | Refills: 5 | Status: SHIPPED | OUTPATIENT
Start: 2019-04-30

## 2019-04-30 RX ORDER — LISINOPRIL 20 MG/1
20 TABLET ORAL DAILY
Qty: 30 TABLET | Refills: 5 | Status: SHIPPED | OUTPATIENT
Start: 2019-04-30 | End: 2019-10-24 | Stop reason: SDUPTHER

## 2019-04-30 RX ORDER — ATORVASTATIN CALCIUM 80 MG/1
80 TABLET, FILM COATED ORAL NIGHTLY
Qty: 30 TABLET | Refills: 5 | Status: SHIPPED | OUTPATIENT
Start: 2019-04-30 | End: 2019-10-24 | Stop reason: SDUPTHER

## 2019-05-09 ENCOUNTER — PROCEDURE VISIT (OUTPATIENT)
Dept: CARDIOLOGY CLINIC | Age: 54
End: 2019-05-09

## 2019-05-09 DIAGNOSIS — I20.8 STABLE ANGINA PECTORIS (HCC): Primary | ICD-10-CM

## 2019-05-09 DIAGNOSIS — R07.9 CHEST PAIN, UNSPECIFIED TYPE: ICD-10-CM

## 2019-05-09 DIAGNOSIS — Z98.61 POST PTCA: ICD-10-CM

## 2019-05-09 DIAGNOSIS — I10 ESSENTIAL HYPERTENSION: ICD-10-CM

## 2019-05-09 LAB
LV EF: 7 %
LV EF: 70 %
LVEF MODALITY: NORMAL
LVEF MODALITY: NORMAL

## 2019-05-09 PROCEDURE — 93015 CV STRESS TEST SUPVJ I&R: CPT | Performed by: INTERNAL MEDICINE

## 2019-05-09 PROCEDURE — A9500 TC99M SESTAMIBI: HCPCS | Performed by: INTERNAL MEDICINE

## 2019-05-09 PROCEDURE — 78452 HT MUSCLE IMAGE SPECT MULT: CPT | Performed by: INTERNAL MEDICINE

## 2019-05-13 ENCOUNTER — TELEPHONE (OUTPATIENT)
Dept: CARDIOLOGY CLINIC | Age: 54
End: 2019-05-13

## 2019-05-13 NOTE — TELEPHONE ENCOUNTER
Advised patient's wife of arin stress test results. She voiced understanding. Supervising physician Dr. Jesika Bryant .   [place] nuclear scintigraphic study suggestive of normal myocardial    perfusion.    Gated images demonstrate normal left ventricular systolic function with EF    of 70 %.      Recommendation    Continue present medical therapy and followup in office as scheduled. Supervising physician Dr. Jesika Bryant .   [place] nuclear scintigraphic study suggestive of normal myocardial    perfusion.    Gated images demonstrate normal left ventricular systolic function with EF    of 70 %.      Recommendation    Continue present medical therapy and followup in office as scheduled.

## 2019-08-23 ENCOUNTER — OFFICE VISIT (OUTPATIENT)
Dept: CARDIOLOGY CLINIC | Age: 54
End: 2019-08-23

## 2019-08-23 ENCOUNTER — TELEPHONE (OUTPATIENT)
Dept: CARDIOLOGY CLINIC | Age: 54
End: 2019-08-23

## 2019-08-23 VITALS
DIASTOLIC BLOOD PRESSURE: 78 MMHG | BODY MASS INDEX: 32.76 KG/M2 | HEART RATE: 60 BPM | WEIGHT: 221.2 LBS | SYSTOLIC BLOOD PRESSURE: 126 MMHG | HEIGHT: 69 IN

## 2019-08-23 DIAGNOSIS — I10 ESSENTIAL HYPERTENSION: Primary | ICD-10-CM

## 2019-08-23 DIAGNOSIS — Z87.891 HISTORY OF TOBACCO USE: ICD-10-CM

## 2019-08-23 DIAGNOSIS — E78.00 PURE HYPERCHOLESTEROLEMIA: ICD-10-CM

## 2019-08-23 DIAGNOSIS — Z98.61 CAD S/P PERCUTANEOUS CORONARY ANGIOPLASTY: ICD-10-CM

## 2019-08-23 DIAGNOSIS — I25.10 CAD S/P PERCUTANEOUS CORONARY ANGIOPLASTY: ICD-10-CM

## 2019-08-23 PROBLEM — I21.3 STEMI (ST ELEVATION MYOCARDIAL INFARCTION) (HCC): Status: RESOLVED | Noted: 2019-03-29 | Resolved: 2019-08-23

## 2019-08-23 PROBLEM — R07.2 PRECORDIAL PAIN: Status: RESOLVED | Noted: 2019-03-29 | Resolved: 2019-08-23

## 2019-08-23 PROBLEM — R07.9 CHEST PAIN: Status: RESOLVED | Noted: 2019-04-15 | Resolved: 2019-08-23

## 2019-08-23 PROCEDURE — 99214 OFFICE O/P EST MOD 30 MIN: CPT | Performed by: NURSE PRACTITIONER

## 2019-08-26 PROBLEM — E78.00 PURE HYPERCHOLESTEROLEMIA: Status: ACTIVE | Noted: 2019-08-26

## 2019-08-26 NOTE — PROGRESS NOTES
[facility] [place]  [address]  Phone: [phone]    Fax [phone]                  Stefanie Mcclellan MD, Aly Suh MD, Mauro Xiong MD, MD Layla Oleary MD Evonne Jun, MD MALLARD FILLMORE GATES, APRN      Rahul Ceron, APRN  Jihan Caraballo, APRN     Mary Johnson, APRN    CARDIOLOGY  NOTE      8/26/2019    RE: Edie Novak  (1965)                               TO:  Dr. Kristian Bloom primary care provider on file. The primary cardiologist is Dr. Dimitri Lynn    CC:   1. Essential hypertension    2. CAD S/P percutaneous coronary angioplasty    3. History of tobacco use    4. Pure hypercholesterolemia      Patient denies all of the following:  Chest Pain  Palpitations  Shortness of Breath  Edema  Dizziness  Syncope      HPI: Thank you for involving me in taking care of your patient Edie Novak, who is a  48y.o. year old male with a history as listed above. Patient is  active and male does exercises regularly. Patient is  compliant with his medications. Patient denies any cardiac complaints or needs.      Vitals:    08/23/19 1312   BP: 126/78   Pulse: 60       Current Outpatient Medications   Medication Sig Dispense Refill    alirocumab (PRALUENT) 75 MG/ML SOPN injection pen Inject 1 mL into the skin every 14 days 1.96 mL 2    alirocumab (PRALUENT) 75 MG/ML SOPN injection pen Inject 1 mL into the skin every 14 days 2 pen 0    atorvastatin (LIPITOR) 80 MG tablet Take 1 tablet by mouth nightly 30 tablet 5    lisinopril (PRINIVIL;ZESTRIL) 20 MG tablet Take 1 tablet by mouth daily 30 tablet 5    metoprolol tartrate (LOPRESSOR) 25 MG tablet Take 1 tablet by mouth 2 times daily 60 tablet 5    clopidogrel (PLAVIX) 75 MG tablet Take 1 tablet by mouth daily 30 tablet 5    aspirin (ASPIRIN CHILDRENS) 81 MG chewable tablet Take 1 tablet by mouth daily 30 tablet 5    nitroGLYCERIN (NITROSTAT) 0.4 MG SL tablet up

## 2019-08-26 NOTE — ASSESSMENT & PLAN NOTE
 Patient is not having cardiac symptoms   continue BB, antiplatelet, ASA   Low salt, Low cholesterol diet   Last stress test 5/2019 Normal Lexiscan nuclear scintigraphic study suggestive of normal myocardial perfusion.   Gated images demonstrate normal left ventricular systolic function with EF of 70 %.     Recommendation   Continue present medical therapy and followup in office as scheduled.

## 2019-10-24 RX ORDER — ATORVASTATIN CALCIUM 80 MG/1
80 TABLET, FILM COATED ORAL NIGHTLY
Qty: 90 TABLET | Refills: 3 | Status: SHIPPED | OUTPATIENT
Start: 2019-10-24 | End: 2020-10-30 | Stop reason: SDUPTHER

## 2019-10-24 RX ORDER — LISINOPRIL 20 MG/1
20 TABLET ORAL DAILY
Qty: 90 TABLET | Refills: 3 | Status: SHIPPED | OUTPATIENT
Start: 2019-10-24 | End: 2020-10-30 | Stop reason: SDUPTHER

## 2019-10-24 RX ORDER — CLOPIDOGREL BISULFATE 75 MG/1
75 TABLET ORAL DAILY
Qty: 90 TABLET | Refills: 3 | Status: SHIPPED | OUTPATIENT
Start: 2019-10-24 | End: 2020-10-30 | Stop reason: SDUPTHER

## 2019-11-06 ENCOUNTER — TELEPHONE (OUTPATIENT)
Dept: CARDIOLOGY CLINIC | Age: 54
End: 2019-11-06

## 2019-11-06 NOTE — TELEPHONE ENCOUNTER
Spoke with patients wife. Wife advised that patient will need to stop in the office to complete renewal form for [employer]. She voiced understanding and will advise the patient. Forms placed at the .

## 2019-11-11 NOTE — TELEPHONE ENCOUNTER
Patient assistance renewal forms complete, scanned into epic and faxed to [facility].

## 2020-01-17 NOTE — TELEPHONE ENCOUNTER
Per [employer] rep, patient will not need renewal of Patient Assistance until 9/12/20. Will contact patient 8/3/20.

## 2020-01-24 RX ORDER — ASPIRIN 81 MG/1
81 TABLET, CHEWABLE ORAL DAILY
Qty: 30 TABLET | Refills: 5 | Status: CANCELLED | OUTPATIENT
Start: 2020-01-24

## 2020-01-24 RX ORDER — LISINOPRIL 20 MG/1
20 TABLET ORAL DAILY
Qty: 90 TABLET | Refills: 3 | Status: CANCELLED | OUTPATIENT
Start: 2020-01-24

## 2020-01-24 RX ORDER — NITROGLYCERIN 0.4 MG/1
TABLET SUBLINGUAL
Qty: 25 TABLET | Refills: 3 | Status: CANCELLED | OUTPATIENT
Start: 2020-01-24

## 2020-01-24 RX ORDER — CLOPIDOGREL BISULFATE 75 MG/1
75 TABLET ORAL DAILY
Qty: 90 TABLET | Refills: 3 | Status: CANCELLED | OUTPATIENT
Start: 2020-01-24

## 2020-01-24 RX ORDER — ATORVASTATIN CALCIUM 80 MG/1
80 TABLET, FILM COATED ORAL NIGHTLY
Qty: 90 TABLET | Refills: 3 | Status: CANCELLED | OUTPATIENT
Start: 2020-01-24

## 2020-02-28 ENCOUNTER — OFFICE VISIT (OUTPATIENT)
Dept: CARDIOLOGY CLINIC | Age: 55
End: 2020-02-28

## 2020-02-28 VITALS
HEART RATE: 68 BPM | DIASTOLIC BLOOD PRESSURE: 88 MMHG | SYSTOLIC BLOOD PRESSURE: 124 MMHG | BODY MASS INDEX: 34.98 KG/M2 | WEIGHT: 236.2 LBS | HEIGHT: 69 IN

## 2020-02-28 PROCEDURE — 99213 OFFICE O/P EST LOW 20 MIN: CPT | Performed by: INTERNAL MEDICINE

## 2020-02-28 NOTE — LETTER
CLINICAL STAFF DOCUMENTATION    Julio Cesar Fournier  1965  [id number]    Have you had any Chest Pain - No    Have you had any Shortness of Breath - No    Have you had any dizziness - No    Have you had any palpitations - No      Do you have any edema -  No    Do you have a surgery or procedure scheduled in the near future - No    Ask patient if they want to sign up for MyChart if they are not already signed up    Check to see if we have an E-MAIL on file for the patient    Check medication list thoroughly!!!  BE SURE TO ASK PATIENT IF THEY NEED MEDICATION REFILLS

## 2020-02-28 NOTE — PROGRESS NOTES
Patient has no known allergies. Past Medical History:   Diagnosis Date    Chest pain     H/O cardiovascular stress test 05/09/2019    EF 70%, Normal    History of cardiac cath 03/29/2019    Severe single vessel disease with total occlusion of RCA with collaterals & mild to moderate disease of the other vessels. Successful stenting of RCA with excellent results. LV: infero basal Hypokinesis with preserved LV function. LVEF 55%    History of exercise stress test 03/29/2019    Reduced exercise performance with angina , has ST elevation in inf leads was sent to cath lab. ASA GIVEN.  Hx of Doppler echocardiogram 04/17/2019    Left ventricular systolic function is normal. EF is visually estimated at 50-55%. Mild left ventricular hypertrophy. No significant valvular disease noted. No pericardial effusion present.  STEMI (ST elevation myocardial infarction) ([address] RCA 3/29/2019     History reviewed. No pertinent surgical history. As reviewed History reviewed. No pertinent family history. Social History     Tobacco Use    Smoking status: Former Smoker     Packs/day: 1.00     Types: Cigarettes    Smokeless tobacco: Never Used   Substance Use Topics    Alcohol use: Yes     Comment: soc      Review of Systems:    Constitutional: Negative for diaphoresis and fatigue  Psychological:Negative for anxiety or depression  HENT: Negative for headaches, nasal congestion, sinus pain or vertigo  Eyes: Negative for visual disturbance.    Endocrine: Negative for polydipsia/polyuria  Respiratory: Negative for shortness of breath  Cardiovascular: Negative for chest pain, dyspnea on exertion, claudication, edema, irregular heartbeat, murmur, palpitations or shortness of breath  Gastrointestinal: Negative for abdominal pain or heartburn  Genito-Urinary: Negative for urinary frequency/urgency  Musculoskeletal: Negative for muscle pain, muscular weakness, negative for pain in arm and leg or swelling in foot and leg  Neurological: Negative for dizziness, headaches, memory loss, numbness/tingling, visual changes, syncope  Dermatological: Negative for rash    Objective:  /88   Pulse 68   Ht 5' 9\" (1.753 m)   Wt 236 lb 3.2 oz (107.1 kg)   BMI 34.88 kg/m²   Wt Readings from Last 3 Encounters:   02/28/20 236 lb 3.2 oz (107.1 kg)   08/23/19 221 lb 3.2 oz (100.3 kg)   04/16/19 213 lb 9.6 oz (96.9 kg)     Body mass index is 34.88 kg/m². GENERAL - Alert, oriented, pleasant, in no apparent distress. EYES: No jaundice, no conjunctival pallor. SKIN: It is warm & dry. No rashes. No Echhymosis    HEENT - No clinically significant abnormalities seen. Neck - Supple. No jugular venous distention noted. No carotid bruits. Cardiovascular - Normal S1 and S2 without obvious murmur or gallop. Extremities - No cyanosis, clubbing, or significant edema. Pulmonary - No respiratory distress. No wheezes or rales. Abdomen - No masses, tenderness, or organomegaly. Musculoskeletal - No significant edema. No joint deformities. No muscle wasting. Neurologic - Cranial nerves II through XII are grossly intact. There were no gross focal neurologic abnormalities.     Lab Review   Lab Results   Component Value Date    TROPONINT <0.010 04/16/2019     BNP:  No results found for: BNP  PT/INR:  No results found for: INR  Lab Results   Component Value Date    LABA1C 5.9 03/30/2019     Lab Results   Component Value Date    WBC 12.5 (H) 04/16/2019    HCT 48.5 04/16/2019    MCV 94.7 04/16/2019     04/16/2019     Lab Results   Component Value Date    CHOL 238 (H) 03/30/2019    TRIG 177 (H) 03/30/2019    HDL 39 (L) 03/30/2019    LDLDIRECT 198 (H) 03/30/2019     Lab Results   Component Value Date    ALT 23 04/15/2019    AST 14 (L) 04/15/2019     BMP:    Lab Results   Component Value Date     04/16/2019    K 5.1 04/16/2019     04/16/2019    CO2 23 04/16/2019    BUN 14 04/16/2019    CREATININE 1.0 04/16/2019     CMP:   Lab Results   Component Value Date     04/16/2019    K 5.1 04/16/2019     04/16/2019    CO2 23 04/16/2019    BUN 14 04/16/2019    PROT 7.1 04/15/2019     TSH:  No results found for: TSH, TSHHS        Impression:    No diagnosis found. Patient Active Problem List   Diagnosis Code    Essential hypertension I10    History of tobacco use Z87.891    CAD S/P percutaneous coronary angioplasty I25.10, Z98.61    Pure hypercholesterolemia E78.00       Assessment & Plan:               -     CORONARY ARTERY DISEASE:  asymptomatic     All available  tests in chart reviewed. Management discussed . Testing ordered  no                               -  Hypertension: Patients blood pressure is normal. Patient is advised about low sodium diet. Present medical regimen will not be changed. -  LIPID MANAGEMENT:  Available lipid  lab data reviewed  and patient was given dietary advice. NCEP- ATP III guidelines reviewed with patient. -   Changes  in medicines made:  Jeri Woody MA  [facility]

## 2020-04-22 RX ORDER — NITROGLYCERIN 0.4 MG/1
TABLET SUBLINGUAL
Qty: 25 TABLET | Refills: 3 | Status: SHIPPED | OUTPATIENT
Start: 2020-04-22

## 2020-09-01 ENCOUNTER — TELEPHONE (OUTPATIENT)
Dept: CARDIOLOGY CLINIC | Age: 55
End: 2020-09-01

## 2020-09-01 NOTE — TELEPHONE ENCOUNTER
Left message on voicemail for patient to return my call as he is set for renewal of [employer] patient assistance as of 9/12/20. Awaiting return call.

## 2020-09-09 NOTE — TELEPHONE ENCOUNTER
Left second message on voicemail to remind patient that we will need to complete patient assistance renewal forms. Awaiting call back.

## 2020-09-11 NOTE — TELEPHONE ENCOUNTER
Spoke with patients wife, advised that the patient is due for renewal of patient assistance. She states that the patient hasn't received it for a long time. She states that they tried to contact the drug company but they got the run around. Advised that the patient should really be on this medication and to have him call the office so that we can restart the application process. She voiced understanding.

## 2020-10-30 RX ORDER — ATORVASTATIN CALCIUM 80 MG/1
80 TABLET, FILM COATED ORAL NIGHTLY
Qty: 90 TABLET | Refills: 3 | Status: SHIPPED | OUTPATIENT
Start: 2020-10-30 | End: 2021-09-21 | Stop reason: SDUPTHER

## 2020-10-30 RX ORDER — LISINOPRIL 20 MG/1
20 TABLET ORAL DAILY
Qty: 90 TABLET | Refills: 3 | Status: SHIPPED | OUTPATIENT
Start: 2020-10-30 | End: 2021-09-21 | Stop reason: SDUPTHER

## 2020-10-30 RX ORDER — CLOPIDOGREL BISULFATE 75 MG/1
75 TABLET ORAL DAILY
Qty: 90 TABLET | Refills: 3 | Status: SHIPPED | OUTPATIENT
Start: 2020-10-30 | End: 2020-11-30 | Stop reason: ALTCHOICE

## 2020-11-27 ENCOUNTER — TELEPHONE (OUTPATIENT)
Dept: CARDIOLOGY CLINIC | Age: 55
End: 2020-11-27

## 2020-11-30 ENCOUNTER — OFFICE VISIT (OUTPATIENT)
Dept: CARDIOLOGY CLINIC | Age: 55
End: 2020-11-30

## 2020-11-30 VITALS
HEIGHT: 69 IN | SYSTOLIC BLOOD PRESSURE: 134 MMHG | BODY MASS INDEX: 33.21 KG/M2 | DIASTOLIC BLOOD PRESSURE: 82 MMHG | WEIGHT: 224.2 LBS | HEART RATE: 64 BPM

## 2020-11-30 DIAGNOSIS — Z98.61 CAD S/P PERCUTANEOUS CORONARY ANGIOPLASTY: ICD-10-CM

## 2020-11-30 DIAGNOSIS — I10 ESSENTIAL HYPERTENSION: ICD-10-CM

## 2020-11-30 DIAGNOSIS — I25.10 CAD S/P PERCUTANEOUS CORONARY ANGIOPLASTY: ICD-10-CM

## 2020-11-30 DIAGNOSIS — E78.00 PURE HYPERCHOLESTEROLEMIA: ICD-10-CM

## 2020-11-30 PROBLEM — E66.09 CLASS 1 OBESITY DUE TO EXCESS CALORIES WITH SERIOUS COMORBIDITY AND BODY MASS INDEX (BMI) OF 33.0 TO 33.9 IN ADULT: Status: ACTIVE | Noted: 2020-11-30

## 2020-11-30 LAB
A/G RATIO: 1.7 (ref 1.1–2.2)
ALBUMIN SERPL-MCNC: 4.5 G/DL (ref 3.4–5)
ALP BLD-CCNC: 89 U/L (ref 40–129)
ALT SERPL-CCNC: 24 U/L (ref 10–40)
ANION GAP SERPL CALCULATED.3IONS-SCNC: 11 MMOL/L (ref 3–16)
AST SERPL-CCNC: 18 U/L (ref 15–37)
BILIRUB SERPL-MCNC: 0.4 MG/DL (ref 0–1)
BUN BLDV-MCNC: 13 MG/DL (ref 7–20)
CALCIUM SERPL-MCNC: 10 MG/DL (ref 8.3–10.6)
CHLORIDE BLD-SCNC: 104 MMOL/L (ref 99–110)
CHOLESTEROL, TOTAL: 225 MG/DL (ref 0–199)
CO2: 27 MMOL/L (ref 21–32)
CREAT SERPL-MCNC: 1.1 MG/DL (ref 0.9–1.3)
GFR AFRICAN AMERICAN: >60
GFR NON-AFRICAN AMERICAN: >60
GLOBULIN: 2.7 G/DL
GLUCOSE BLD-MCNC: 115 MG/DL (ref 70–99)
HDLC SERPL-MCNC: 56 MG/DL (ref 40–60)
LDL CHOLESTEROL CALCULATED: 130 MG/DL
POTASSIUM SERPL-SCNC: 4.8 MMOL/L (ref 3.5–5.1)
SODIUM BLD-SCNC: 142 MMOL/L (ref 136–145)
TOTAL PROTEIN: 7.2 G/DL (ref 6.4–8.2)
TRIGL SERPL-MCNC: 193 MG/DL (ref 0–150)
VLDLC SERPL CALC-MCNC: 39 MG/DL

## 2020-11-30 PROCEDURE — 99214 OFFICE O/P EST MOD 30 MIN: CPT | Performed by: NURSE PRACTITIONER

## 2020-11-30 ASSESSMENT — ENCOUNTER SYMPTOMS
CONSTIPATION: 0
SHORTNESS OF BREATH: 0
VOMITING: 0
NAUSEA: 0
COUGH: 0
ABDOMINAL PAIN: 0
PHOTOPHOBIA: 0
DIARRHEA: 0
COLOR CHANGE: 0
BLOOD IN STOOL: 0
SINUS PAIN: 0

## 2020-11-30 NOTE — LETTER
Leydi Puga  1965  [id number]    Have you had any Chest Pain that is not new? - No    ? DO EKG IF: Patient has a Heart Rate above 100 or below 40     CAD (Coronary Artery Disease) patient should have one on file every 6 months        Have you had any Shortness of Breath - No      Have you had any dizziness - No      ? Sitting wait 5 minutes do supine (laying down) wait 5 minutes then do standing - log each in \"vitals\" area in Epic  ? Be sure to ask what symptoms they are having if they get dizzy while completing ortho stats such as room spinning, nausea, etc.    Have you had any palpitations that are not new?  - No       Is the patient on any of the following medications -     Do you have any edema - swelling in No      Do you have a surgery or procedure scheduled in the near future - No

## 2020-11-30 NOTE — PROGRESS NOTES
days (Patient not taking: Reported on 2/28/2020) 1.96 mL 2    alirocumab (PRALUENT) 75 MG/ML SOPN injection pen Inject 1 mL into the skin every 14 days (Patient not taking: Reported on 2/28/2020) 2 pen 0    aspirin (ASPIRIN CHILDRENS) 81 MG chewable tablet Take 1 tablet by mouth daily 30 tablet 5     No current facility-administered medications for this visit. Allergies: Patient has no known allergies. Past Medical History:   Diagnosis Date    Chest pain     H/O cardiovascular stress test 05/09/2019    EF 70%, Normal    History of cardiac cath 03/29/2019    Severe single vessel disease with total occlusion of RCA with collaterals & mild to moderate disease of the other vessels. Successful stenting of RCA with excellent results. LV: infero basal Hypokinesis with preserved LV function. LVEF 55%    History of exercise stress test 03/29/2019    Reduced exercise performance with angina , has ST elevation in inf leads was sent to cath lab. ASA GIVEN.  Hx of Doppler echocardiogram 04/17/2019    Left ventricular systolic function is normal. EF is visually estimated at 50-55%. Mild left ventricular hypertrophy. No significant valvular disease noted. No pericardial effusion present.  STEMI (ST elevation myocardial infarction) ([address] RCA 3/29/2019     No past surgical history on file. No family history on file. Social History     Tobacco Use    Smoking status: Former Smoker     Packs/day: 1.00     Types: Cigarettes    Smokeless tobacco: Never Used   Substance Use Topics    Alcohol use: Yes     Comment: soc        Review of Systems   Constitutional: Negative for fatigue and fever. HENT: Negative for ear pain and sinus pain. Eyes: Negative for photophobia and visual disturbance. Respiratory: Negative for cough and shortness of breath. Cardiovascular: Negative for chest pain, palpitations and leg swelling.    Gastrointestinal: Negative for abdominal pain, blood in stool, constipation, diarrhea, nausea and vomiting. Endocrine: Negative for polyphagia and polyuria. Genitourinary: Negative for decreased urine volume and difficulty urinating. Musculoskeletal: Negative for arthralgias and gait problem. Skin: Negative for color change and wound. Neurological: Negative for dizziness, syncope, weakness, light-headedness and headaches. Psychiatric/Behavioral: Negative for agitation. The patient is not hyperactive. Objective:      Physical Exam:  /82   Pulse 64   Ht 5' 9\" (1.753 m)   Wt 224 lb 3.2 oz (101.7 kg)   BMI 33.11 kg/m²   Wt Readings from Last 3 Encounters:   11/30/20 224 lb 3.2 oz (101.7 kg)   02/28/20 236 lb 3.2 oz (107.1 kg)   08/23/19 221 lb 3.2 oz (100.3 kg)     Body mass index is 33.11 kg/m². Physical Exam  Vitals signs reviewed. Constitutional:       General: He is not in acute distress. Appearance: Normal appearance. He is obese. He is not ill-appearing. HENT:      Head: Normocephalic and atraumatic. Eyes:      Conjunctiva/sclera: Conjunctivae normal.      Pupils: Pupils are equal, round, and reactive to light. Neck:      Musculoskeletal: Neck supple. No muscular tenderness. Vascular: No carotid bruit. Cardiovascular:      Rate and Rhythm: Normal rate and regular rhythm. Pulses: Normal pulses. Heart sounds: Normal heart sounds. No murmur. Pulmonary:      Effort: Pulmonary effort is normal. No respiratory distress. Breath sounds: Normal breath sounds. Musculoskeletal:         General: No swelling or deformity. Skin:     General: Skin is warm and dry. Capillary Refill: Capillary refill takes less than 2 seconds. Neurological:      Mental Status: He is alert and oriented to person, place, and time. Psychiatric:         Mood and Affect: Mood normal.         Behavior: Behavior normal.         Thought Content:  Thought content normal.         Judgment: Judgment normal.           DATA:  No results found for: CKTOTAL, CKMB, CKMBINDEX, TROPONINI  BNP:  No results found for: BNP  PT/INR:  No results found for: PTINR  Lab Results   Component Value Date    LABA1C 5.9 03/30/2019     Lab Results   Component Value Date    CHOL 238 (H) 03/30/2019    TRIG 177 (H) 03/30/2019    HDL 39 (L) 03/30/2019    LDLDIRECT 198 (H) 03/30/2019     Lab Results   Component Value Date    ALT 23 04/15/2019    AST 14 (L) 04/15/2019     TSH:  No results found for: TSH      Assessment/ Plan:     CAD S/P percutaneous coronary angioplasty   Patient had stent placed due to STEMI March 2019.  Patient is not having cardiac symptoms   continue BB, ASA   We will stop Plavix as therapy is completed as stent placement was more than 1 year ago   Low salt, Low cholesterol diet   Last stress test 5/2019 Normal Lexiscan nuclear scintigraphic study suggestive of normal myocardial perfusion.   Gated images demonstrate normal left ventricular systolic function with EF of 70 %. Pure hypercholesterolemia   Lipid panel reviewed   Patient LDL is not at goal, HDL is not at goal per cholesterol panel March 2019. Lipid panel ordered   Patient is on a Statin   Had discussed with patient previously about starting PC K9 inhibitor. Patient had attempted but due to affordability   Discussed with the patient the need for exercise, low cholesterol diet, and compliance with medications. Essential hypertension   Controlled   To continue Beta blocker, ACE   advised low salt diet    Last echo 8/2019 showed Left ventricular systolic function is normal.   Ejection fraction is visually estimated at 50-55%.   Mild left ventricular hypertrophy.   No significant valvular disease noted.   No pericardial effusion present. Class 1 obesity due to excess calories with serious comorbidity and body mass index (BMI) of 33.0 to 33.9 in adult  Patient has lost approximately 8 pounds since last office visit. Encourage patient to continue to lose weight and increase exercise. Patient states that he has been avoiding more takeout food. Patient seen, interviewed and examined. Testing was reviewed. Patient is encouraged to exercise even a brisk walk for 30 minutes at least 3 to 4 times a week. Lifestyle and risk factor modificatons discussed. Various goals are discussed and questions answered. Continue current medications. Appropriate prescriptions are addressed. Questions answered and patient verbalizes understanding. Call for any problems, questions, or concerns.     Pt is to follow up in 9 months for Cardiac management    Electronically signed by ANNY He CNP on 11/30/2020 at 8:07 AM

## 2020-11-30 NOTE — PATIENT INSTRUCTIONS
Please be informed that if you contact our office outside of normal business hours the physician on call cannot help with any scheduling or rescheduling issues, procedure instruction questions or any type of medication issue. We advise you for any urgent/emergency that you go to the nearest emergency room!     PLEASE CALL OUR OFFICE DURING NORMAL BUSINESS HOURS    Monday - Friday   8 am to 5 pm    [place] [address]: [phone]    [place]:  [phone]

## 2020-11-30 NOTE — ASSESSMENT & PLAN NOTE
Patient has lost approximately 8 pounds since last office visit. Encourage patient to continue to lose weight and increase exercise. Patient states that he has been avoiding more takeout food.

## 2020-11-30 NOTE — ASSESSMENT & PLAN NOTE
 Patient had stent placed due to STEMI March 2019.  Patient is not having cardiac symptoms   continue BB, ASA   We will stop Plavix as therapy is completed as stent placement was more than 1 year ago   Low salt, Low cholesterol diet   Last stress test 5/2019 Normal Lexiscan nuclear scintigraphic study suggestive of normal myocardial perfusion.   Gated images demonstrate normal left ventricular systolic function with EF of 70 %.

## 2020-11-30 NOTE — ASSESSMENT & PLAN NOTE
 Controlled   To continue Beta blocker, ACE   advised low salt diet    Last echo 8/2019 showed Left ventricular systolic function is normal.   Ejection fraction is visually estimated at 50-55%.   Mild left ventricular hypertrophy.   No significant valvular disease noted.   No pericardial effusion present.

## 2020-11-30 NOTE — ASSESSMENT & PLAN NOTE
 Lipid panel reviewed   Patient LDL is not at goal, HDL is not at goal per cholesterol panel March 2019. Lipid panel ordered   Patient is on a Statin   Had discussed with patient previously about starting PC K9 inhibitor. Patient had attempted but due to affordability   Discussed with the patient the need for exercise, low cholesterol diet, and compliance with medications.

## 2020-12-01 ENCOUNTER — TELEPHONE (OUTPATIENT)
Dept: CARDIOLOGY CLINIC | Age: 55
End: 2020-12-01

## 2020-12-01 NOTE — TELEPHONE ENCOUNTER
Lipid panel is not at goal.   Will start PCSK9 inhibitor.    Patient will need assistance with cost.   Recommend recheck lipid panel 6 weeks after starting repatha

## 2020-12-01 NOTE — TELEPHONE ENCOUNTER
Left message with patients wife to advise patient to stop by the office to  an application. She voiced understanding and states that she or the patient will stop by 12/2/20 to pick it up.

## 2021-09-21 ENCOUNTER — OFFICE VISIT (OUTPATIENT)
Dept: CARDIOLOGY CLINIC | Age: 56
End: 2021-09-21

## 2021-09-21 VITALS
HEIGHT: 69 IN | SYSTOLIC BLOOD PRESSURE: 142 MMHG | WEIGHT: 231 LBS | DIASTOLIC BLOOD PRESSURE: 80 MMHG | BODY MASS INDEX: 34.21 KG/M2

## 2021-09-21 DIAGNOSIS — E78.00 PURE HYPERCHOLESTEROLEMIA: ICD-10-CM

## 2021-09-21 DIAGNOSIS — I25.10 CAD IN NATIVE ARTERY: Primary | ICD-10-CM

## 2021-09-21 DIAGNOSIS — I10 ESSENTIAL HYPERTENSION: ICD-10-CM

## 2021-09-21 PROCEDURE — 93000 ELECTROCARDIOGRAM COMPLETE: CPT | Performed by: NURSE PRACTITIONER

## 2021-09-21 PROCEDURE — 99214 OFFICE O/P EST MOD 30 MIN: CPT | Performed by: NURSE PRACTITIONER

## 2021-09-21 RX ORDER — LISINOPRIL 30 MG/1
30 TABLET ORAL DAILY
Qty: 90 TABLET | Refills: 3 | Status: SHIPPED | OUTPATIENT
Start: 2021-09-21 | End: 2022-03-25

## 2021-09-21 RX ORDER — ATORVASTATIN CALCIUM 80 MG/1
80 TABLET, FILM COATED ORAL NIGHTLY
Qty: 90 TABLET | Refills: 3 | Status: SHIPPED | OUTPATIENT
Start: 2021-09-21 | End: 2022-03-25 | Stop reason: SDUPTHER

## 2021-09-21 ASSESSMENT — ENCOUNTER SYMPTOMS
SHORTNESS OF BREATH: 0
ORTHOPNEA: 0

## 2021-09-21 NOTE — PATIENT INSTRUCTIONS
**It is YOUR responsibilty to bring medication bottles and/or updated medication list to [address] [place] [address]. This will allow us to better serve you and all your healthcare needs**      Please be informed that if you contact our office outside of normal business hours the physician on call cannot help with any scheduling or rescheduling issues, procedure instruction questions or any type of medication issue. We advise you for any urgent/emergency that you go to the nearest emergency room!     PLEASE CALL OUR OFFICE DURING NORMAL BUSINESS HOURS    Monday - Friday   8 am to 5 pm    [place]: [address]: [phone]    [place]:  [phone]

## 2021-09-21 NOTE — PROGRESS NOTES
9/21/2021  Primary cardiologist: Dr. Day Height  is an established 49 y.o.  male here for follow-up on   1. CAD in native artery    2. Essential hypertension    3. Pure hypercholesterolemia          SUBJECTIVE/OBJECTIVE:    HPI : Tamiko Ray is a 49year old gentleman with a history of CAD s/p PCI, hypertension and hypercholesterolemia. He was initially seen in March 2019 for chest pain. He underwent noninvasive testing with stress test and was noted to have ST elevation in the inferior leads concerning for STEMI. He was then taken directly to Cath Lab where he was noted to have 100% occlusion of the RCA requiring PCI. Tamiko Ray reports overall he is feeling good. He denies chest chest. He notes shortness of breath with exertion that is not new. He smokes on occasion. He is not engaged in organized exercise. Review of Systems   Constitutional: Negative for diaphoresis and malaise/fatigue. Cardiovascular: Positive for dyspnea on exertion. Negative for chest pain, claudication, irregular heartbeat, leg swelling, near-syncope, orthopnea, palpitations and paroxysmal nocturnal dyspnea. Respiratory: Negative for shortness of breath. Neurological: Negative for dizziness and light-headedness. Vitals:    09/21/21 0845 09/21/21 0851   BP: (!) 146/88 (!) 142/80   Site: Left Upper Arm Left Upper Arm   Position: Sitting Sitting   Cuff Size: Large Adult Large Adult   Weight: 231 lb (104.8 kg)    Height: 5' 9\" (1.753 m)      No flowsheet data found. Wt Readings from Last 3 Encounters:   09/21/21 231 lb (104.8 kg)   11/30/20 224 lb 3.2 oz (101.7 kg)   02/28/20 236 lb 3.2 oz (107.1 kg)     Body mass index is 34.11 kg/m². Physical Exam  Constitutional:       Appearance: Normal appearance. HENT:      Head: Normocephalic and atraumatic. Eyes:      Extraocular Movements: Extraocular movements intact. Pupils: Pupils are equal, round, and reactive to light.    Cardiovascular:      Rate and Rhythm: Normal rate and regular rhythm. Pulses: Normal pulses. Pulmonary:      Effort: Pulmonary effort is normal.      Breath sounds: Normal breath sounds. Abdominal:      General: There is no distension. Palpations: Abdomen is soft. Tenderness: There is no abdominal tenderness. Musculoskeletal:         General: Normal range of motion. Cervical back: Normal range of motion and neck supple. Right lower leg: No edema. Left lower leg: No edema. Skin:     General: Skin is warm. Capillary Refill: Capillary refill takes less than 2 seconds. Neurological:      General: No focal deficit present. Mental Status: He is alert and oriented to person, place, and time. Psychiatric:         Mood and Affect: Mood normal.         Behavior: Behavior normal.                Current Outpatient Medications   Medication Sig Dispense Refill    metoprolol tartrate (LOPRESSOR) 25 MG tablet Take 1 tablet by mouth 2 times daily 180 tablet 3    lisinopril (PRINIVIL;ZESTRIL) 20 MG tablet Take 1 tablet by mouth daily 90 tablet 3    atorvastatin (LIPITOR) 80 MG tablet Take 1 tablet by mouth nightly 90 tablet 3    aspirin (ASPIRIN CHILDRENS) 81 MG chewable tablet Take 1 tablet by mouth daily 30 tablet 5    Evolocumab 140 MG/ML SOAJ Inject 140 mg into the skin every 14 days (Patient not taking: Reported on 9/21/2021) 2 pen 5    nitroGLYCERIN (NITROSTAT) 0.4 MG SL tablet up to max of 3 total doses. If no relief after 1 dose, call 911. (Patient not taking: Reported on 9/21/2021) 25 tablet 3     No current facility-administered medications for this visit. All pertinent data reviewed and discussed with patient  Left heart catheterization March 2019  Cardiac Arteries and Lesion Findings   LMCA: Normal.   LAD: Multiple stenosis. 40 & 50 % mid vessel tandem lesions.   LCx: Multiple stenosis. 50 % Ostial & mid vessel stenosis.   RCA: Acute occlusion. occlude in mid section. Left to right collaterals noted. Lesion on Mid RCA: 100% stenosis. Culprit lesion. ASSESSMENT/PLAN:    Coronary artery disease  No symptoms from CAD  stable  Discussed aggressive risk factor modification including diet, good blood pressure control, and lipid management  Encouraged a diet emphasizing intake of vegetables, fruits, legumes, nuts, whole grains, and fish  Recommend to continue with asa and metoprolol     Hypertension   Blood pressure is Uncontrolled- increase lisinopril to 30 mg daily   Encouraged a low sodium diet      Hypercholesterolemia   No recent lipids available -lab slip given   Goals for lipids reviewed  encouraged healthy eating habits including low fat -low cholesterol diet  Continue with atorvastatin       Patient is using tobacco at this time  Encouraged to stop      Medications reviewed and confirmed with patient     Tests ordered:  Lipids       Follow-up  6 months       Signed:  ANNY Cruz CNP, 9/21/2021, 8:55 AM    An electronic signature was used to authenticate this note.

## 2021-09-21 NOTE — LETTER
Mayra Navarro  1965  [id number]    Have you had any Chest Pain that is not new? - No       Have you had any Shortness of Breath - Yes  If Yes - When on exertion    Have you had any dizziness - No       Have you had any palpitations that are not new? - No         Do you have any edema - swelling in No    If Yes - CHECK TO SEE IF THE EDEMA IS PITTING      When did you have your last labs drawn 11/30/2020 none since      If we do not have these labs you are retrieve these labs for these providers!     Do you have a surgery or procedure scheduled in the near future - No  If Yes- DO EKG

## 2021-09-24 ENCOUNTER — TELEPHONE (OUTPATIENT)
Dept: CARDIOLOGY CLINIC | Age: 56
End: 2021-09-24

## 2021-09-24 NOTE — TELEPHONE ENCOUNTER
----- Message from ANNY De Los Santos CNP sent at 9/23/2021  2:58 PM EDT -----  Lipids not at goal- ?  What happened to PCSK9-

## 2021-09-24 NOTE — TELEPHONE ENCOUNTER
Contains abnormal data Lipid Panel  Order: [id number]  Status:  Final result   Visible to patient:  No (not released) Dx:  Pure hypercholesterolemia; Essential ...   0 Result Notes   Ref Range & Units 2 d ago 9 mo ago 2 yr ago   Cholesterol, Total 0 - 199 mg/dL 216High   225High      Triglycerides 0 - 150 mg/dL 239High   193High   177High  R    HDL 40 - 60 mg/dL 46  56  39Low  R    LDL Calculated <100 mg/dL 122High   130High      VLDL Cholesterol Calculated Not Established mg/dL 48  39     Cholesterol    238High  R, CM    LDL Direct    198High  R    Resulting Agency  [facility]      Narrative  Performed by: [facility]  Performed at:   [facility]   [address] [facility] [address] [facility] [address]   Phone [phone]      Specimen Collected: 09/22/21 08:07 Last Resulted: 09/22/21 20:55                  Left msg on pt's v/m to return our call to discuss results.

## 2022-03-25 ENCOUNTER — OFFICE VISIT (OUTPATIENT)
Dept: CARDIOLOGY CLINIC | Age: 57
End: 2022-03-25
Payer: COMMERCIAL

## 2022-03-25 VITALS
BODY MASS INDEX: 31.4 KG/M2 | HEIGHT: 69 IN | SYSTOLIC BLOOD PRESSURE: 144 MMHG | WEIGHT: 212 LBS | HEART RATE: 76 BPM | DIASTOLIC BLOOD PRESSURE: 90 MMHG

## 2022-03-25 DIAGNOSIS — E78.00 PURE HYPERCHOLESTEROLEMIA: ICD-10-CM

## 2022-03-25 DIAGNOSIS — I10 ESSENTIAL HYPERTENSION: ICD-10-CM

## 2022-03-25 DIAGNOSIS — I25.10 CAD IN NATIVE ARTERY: Primary | ICD-10-CM

## 2022-03-25 PROCEDURE — G8427 DOCREV CUR MEDS BY ELIG CLIN: HCPCS | Performed by: NURSE PRACTITIONER

## 2022-03-25 PROCEDURE — 3017F COLORECTAL CA SCREEN DOC REV: CPT | Performed by: NURSE PRACTITIONER

## 2022-03-25 PROCEDURE — G8417 CALC BMI ABV UP PARAM F/U: HCPCS | Performed by: NURSE PRACTITIONER

## 2022-03-25 PROCEDURE — 4004F PT TOBACCO SCREEN RCVD TLK: CPT | Performed by: NURSE PRACTITIONER

## 2022-03-25 PROCEDURE — G8484 FLU IMMUNIZE NO ADMIN: HCPCS | Performed by: NURSE PRACTITIONER

## 2022-03-25 PROCEDURE — 99214 OFFICE O/P EST MOD 30 MIN: CPT | Performed by: NURSE PRACTITIONER

## 2022-03-25 RX ORDER — ATORVASTATIN CALCIUM 80 MG/1
80 TABLET, FILM COATED ORAL NIGHTLY
Qty: 90 TABLET | Refills: 3 | Status: SHIPPED | OUTPATIENT
Start: 2022-03-25

## 2022-03-25 RX ORDER — LISINOPRIL 40 MG/1
40 TABLET ORAL DAILY
Qty: 90 TABLET | Refills: 3 | Status: SHIPPED | OUTPATIENT
Start: 2022-03-25

## 2022-03-25 ASSESSMENT — ENCOUNTER SYMPTOMS
ORTHOPNEA: 0
SHORTNESS OF BREATH: 0

## 2022-12-07 NOTE — TELEPHONE ENCOUNTER
LM on VM to follow up with patient after having ABN stress test in office and being sent to the cath lab. Reduced exercise performance with angina , has ST elevation in inf leads was   sent to cath lab. ASA GIVEN. 1. Severe single vessel disease with total occlusion of RCA (   culprit vessel ) with collaterals & mild to moderate disease of   the other vessels.   2. Successful stenting of RCA with excellent results.   3. LV: infero basal Hypokinesis with preserved LV function. LVEF   is 55 %.     Patient tolerated the procedure well.   No immediate complications.      Recommendations   Aggressive risk factor modification and medical therapy   according to protocol .     Patient has follow up in office with Dr. Catrina Yanes this week.
Immediate family member